# Patient Record
Sex: MALE | Race: WHITE | NOT HISPANIC OR LATINO | Employment: OTHER | URBAN - METROPOLITAN AREA
[De-identification: names, ages, dates, MRNs, and addresses within clinical notes are randomized per-mention and may not be internally consistent; named-entity substitution may affect disease eponyms.]

---

## 2019-01-25 ENCOUNTER — OFFICE VISIT (OUTPATIENT)
Dept: FAMILY MEDICINE CLINIC | Facility: CLINIC | Age: 34
End: 2019-01-25

## 2019-01-25 VITALS
RESPIRATION RATE: 16 BRPM | SYSTOLIC BLOOD PRESSURE: 158 MMHG | HEIGHT: 70 IN | TEMPERATURE: 98.4 F | WEIGHT: 192 LBS | DIASTOLIC BLOOD PRESSURE: 80 MMHG | BODY MASS INDEX: 27.49 KG/M2 | OXYGEN SATURATION: 99 % | HEART RATE: 63 BPM

## 2019-01-25 DIAGNOSIS — T75.3XXA MOTION SICKNESS, INITIAL ENCOUNTER: ICD-10-CM

## 2019-01-25 DIAGNOSIS — R00.2 PALPITATIONS: ICD-10-CM

## 2019-01-25 DIAGNOSIS — Z23 NEED FOR TETANUS BOOSTER: Primary | ICD-10-CM

## 2019-01-25 PROCEDURE — 99203 OFFICE O/P NEW LOW 30 MIN: CPT | Performed by: NURSE PRACTITIONER

## 2019-01-25 PROCEDURE — 90471 IMMUNIZATION ADMIN: CPT | Performed by: NURSE PRACTITIONER

## 2019-01-25 PROCEDURE — 93000 ELECTROCARDIOGRAM COMPLETE: CPT | Performed by: NURSE PRACTITIONER

## 2019-01-25 PROCEDURE — 90715 TDAP VACCINE 7 YRS/> IM: CPT | Performed by: NURSE PRACTITIONER

## 2019-01-25 RX ORDER — OMEPRAZOLE 20 MG/1
20 CAPSULE, DELAYED RELEASE ORAL DAILY PRN
COMMUNITY
End: 2022-04-14

## 2019-01-25 RX ORDER — SCOLOPAMINE TRANSDERMAL SYSTEM 1 MG/1
1 PATCH, EXTENDED RELEASE TRANSDERMAL
Qty: 6 PATCH | Refills: 0 | OUTPATIENT
Start: 2019-01-25 | End: 2019-04-03

## 2019-01-25 NOTE — PROGRESS NOTES
Aneudy Mckeon is a 33 y.o. male who presents  to establish care and for prophylaxis for motion sickness as well as palpitations.    Chief Complaint   Patient presents with   • Establish Care   • Palpitations       Patient states that he has had heart palpitations for about 6 months. Sometimes he feels like it is palpitating and that he has to cough to get it to quick. He is also getting  tomorrow and is going on a cruise on his honeymoon and would like something for motion sickness.           History reviewed. No pertinent past medical history.    History reviewed. No pertinent surgical history.    Family History   Problem Relation Age of Onset   • Diabetes type II Mother    • Diabetes type II Father    • Colon cancer Paternal Grandmother    • Diabetes type II Paternal Grandfather    • Coronary artery disease Paternal Grandfather        Social History     Socioeconomic History   • Marital status: Single     Spouse name: Not on file   • Number of children: Not on file   • Years of education: Not on file   • Highest education level: Not on file   Social Needs   • Financial resource strain: Not on file   • Food insecurity - worry: Not on file   • Food insecurity - inability: Not on file   • Transportation needs - medical: Not on file   • Transportation needs - non-medical: Not on file   Occupational History   • Not on file   Tobacco Use   • Smoking status: Never Smoker   • Smokeless tobacco: Never Used   Substance and Sexual Activity   • Alcohol use: No   • Drug use: No   • Sexual activity: Not on file   Other Topics Concern   • Not on file   Social History Narrative   • Not on file       Allergies   Allergen Reactions   • Benadryl [Diphenhydramine] Rash       ROS    Review of Systems   Constitutional: Positive for fatigue.   Cardiovascular: Positive for palpitations.   Gastrointestinal: Positive for nausea, GERD and indigestion.   Allergic/Immunologic: Positive for environmental allergies.   Neurological:  "Negative for weakness.   Psychiatric/Behavioral: Positive for stress.   All other systems reviewed and are negative.      Vitals:    01/25/19 1314   BP: 158/80   BP Location: Left arm   Patient Position: Sitting   Cuff Size: Adult   Pulse: 63   Resp: 16   Temp: 98.4 °F (36.9 °C)   TempSrc: Oral   SpO2: 99%   Weight: 87.1 kg (192 lb)   Height: 177.8 cm (70\")   PainSc: 0-No pain         Current Outpatient Medications:   •  omeprazole (priLOSEC) 20 MG capsule, Take 20 mg by mouth Daily., Disp: , Rfl:   •  Probiotic Product (PROBIOTIC-10 PO), Take  by mouth., Disp: , Rfl:   •  Scopolamine (TRANSDERM-SCOP, 1.5 MG,) 1.5 MG/3DAYS patch, Place 1 patch on the skin as directed by provider Every 72 (Seventy-Two) Hours., Disp: 6 patch, Rfl: 0    PE    Physical Exam   Nursing note and vitals reviewed.       A/P    Aneudy was seen today for establish care and palpitations.    Diagnoses and all orders for this visit:    Need for tetanus booster  -     Tdap Vaccine Greater Than or Equal To 8yo IM    Palpitations  -     ECG 12 Lead    Motion sickness, initial encounter  -     Scopolamine (TRANSDERM-SCOP, 1.5 MG,) 1.5 MG/3DAYS patch; Place 1 patch on the skin as directed by provider Every 72 (Seventy-Two) Hours.      ECG 12 Lead  Date/Time: 1/25/2019 1:59 PM  Performed by: Bailey Duncan APRN  Authorized by: Bailey Duncan APRN   Comparison: not compared with previous ECG   Previous ECG: no previous ECG available  Rhythm: sinus bradycardia  Rate: bradycardic  BPM: 52  Conduction: conduction normal  ST Segments: ST segments normal  T flattening: III  QRS axis: normal  Clinical impression: non-specific ECG              Plan of care reviewed with patient at the conclusion of today's visit. Education was provided regarding diagnosis, management and any prescribed or recommended OTC medications.  Patient verbalizes understanding of and agreement with management plan.    Return in about 3 months (around 4/25/2019) for Annual. "     Bailey Duncan, APRN

## 2019-11-07 ENCOUNTER — OFFICE VISIT (OUTPATIENT)
Dept: FAMILY MEDICINE CLINIC | Facility: CLINIC | Age: 34
End: 2019-11-07

## 2019-11-07 VITALS
OXYGEN SATURATION: 98 % | BODY MASS INDEX: 24.68 KG/M2 | WEIGHT: 172.4 LBS | SYSTOLIC BLOOD PRESSURE: 110 MMHG | RESPIRATION RATE: 12 BRPM | TEMPERATURE: 97 F | HEIGHT: 70 IN | HEART RATE: 57 BPM | DIASTOLIC BLOOD PRESSURE: 78 MMHG

## 2019-11-07 DIAGNOSIS — H65.93 MIDDLE EAR EFFUSION, BILATERAL: ICD-10-CM

## 2019-11-07 DIAGNOSIS — J01.00 ACUTE NON-RECURRENT MAXILLARY SINUSITIS: Primary | ICD-10-CM

## 2019-11-07 PROCEDURE — 99213 OFFICE O/P EST LOW 20 MIN: CPT | Performed by: NURSE PRACTITIONER

## 2019-11-07 RX ORDER — AMOXICILLIN AND CLAVULANATE POTASSIUM 875; 125 MG/1; MG/1
1 TABLET, FILM COATED ORAL 2 TIMES DAILY
Qty: 20 TABLET | Refills: 0 | Status: SHIPPED | OUTPATIENT
Start: 2019-11-07 | End: 2019-11-07 | Stop reason: SDUPTHER

## 2019-11-07 RX ORDER — PSEUDOEPHEDRINE HYDROCHLORIDE 60 MG/1
60 TABLET, FILM COATED ORAL EVERY 4 HOURS PRN
Qty: 20 TABLET | Refills: 0 | Status: SHIPPED | OUTPATIENT
Start: 2019-11-07 | End: 2019-11-07 | Stop reason: SDUPTHER

## 2019-11-07 RX ORDER — PSEUDOEPHEDRINE HYDROCHLORIDE 60 MG/1
60 TABLET, FILM COATED ORAL EVERY 4 HOURS PRN
Qty: 20 TABLET | Refills: 0 | Status: SHIPPED | OUTPATIENT
Start: 2019-11-07 | End: 2020-02-11

## 2019-11-07 RX ORDER — INFLUENZA VIRUS VACCINE 15; 15; 15; 15 UG/.5ML; UG/.5ML; UG/.5ML; UG/.5ML
SUSPENSION INTRAMUSCULAR
Refills: 0 | COMMUNITY
Start: 2019-10-25 | End: 2020-02-11

## 2019-11-07 RX ORDER — AMOXICILLIN AND CLAVULANATE POTASSIUM 875; 125 MG/1; MG/1
1 TABLET, FILM COATED ORAL 2 TIMES DAILY
Qty: 20 TABLET | Refills: 0 | Status: SHIPPED | OUTPATIENT
Start: 2019-11-07 | End: 2019-11-17

## 2020-02-11 ENCOUNTER — OFFICE VISIT (OUTPATIENT)
Dept: FAMILY MEDICINE CLINIC | Facility: CLINIC | Age: 35
End: 2020-02-11

## 2020-02-11 VITALS
WEIGHT: 184 LBS | HEART RATE: 65 BPM | OXYGEN SATURATION: 98 % | BODY MASS INDEX: 26.34 KG/M2 | SYSTOLIC BLOOD PRESSURE: 130 MMHG | RESPIRATION RATE: 13 BRPM | DIASTOLIC BLOOD PRESSURE: 62 MMHG | TEMPERATURE: 97.9 F | HEIGHT: 70 IN

## 2020-02-11 DIAGNOSIS — Z00.00 ANNUAL PHYSICAL EXAM: ICD-10-CM

## 2020-02-11 DIAGNOSIS — R00.2 PALPITATIONS: Primary | ICD-10-CM

## 2020-02-11 DIAGNOSIS — Z13.220 LIPID SCREENING: ICD-10-CM

## 2020-02-11 DIAGNOSIS — Z12.5 PROSTATE CANCER SCREENING: ICD-10-CM

## 2020-02-11 PROBLEM — K21.9 GERD (GASTROESOPHAGEAL REFLUX DISEASE): Status: ACTIVE | Noted: 2020-02-11

## 2020-02-11 PROBLEM — J30.89 ENVIRONMENTAL AND SEASONAL ALLERGIES: Status: ACTIVE | Noted: 2020-02-11

## 2020-02-11 LAB
ALBUMIN SERPL-MCNC: 4.8 G/DL (ref 3.5–5.2)
ALBUMIN/GLOB SERPL: 2 G/DL
ALP SERPL-CCNC: 56 U/L (ref 39–117)
ALT SERPL W P-5'-P-CCNC: 32 U/L (ref 1–41)
ANION GAP SERPL CALCULATED.3IONS-SCNC: 14 MMOL/L (ref 5–15)
AST SERPL-CCNC: 18 U/L (ref 1–40)
BASOPHILS # BLD AUTO: 0.04 10*3/MM3 (ref 0–0.2)
BASOPHILS NFR BLD AUTO: 0.7 % (ref 0–1.5)
BILIRUB BLD-MCNC: NEGATIVE MG/DL
BILIRUB SERPL-MCNC: 0.5 MG/DL (ref 0.2–1.2)
BUN BLD-MCNC: 17 MG/DL (ref 6–20)
BUN/CREAT SERPL: 17.3 (ref 7–25)
CALCIUM SPEC-SCNC: 10 MG/DL (ref 8.6–10.5)
CHLORIDE SERPL-SCNC: 102 MMOL/L (ref 98–107)
CHOLEST SERPL-MCNC: 155 MG/DL (ref 0–200)
CLARITY, POC: CLEAR
CO2 SERPL-SCNC: 27 MMOL/L (ref 22–29)
COLOR UR: YELLOW
CREAT BLD-MCNC: 0.98 MG/DL (ref 0.76–1.27)
DEPRECATED RDW RBC AUTO: 39.8 FL (ref 37–54)
EOSINOPHIL # BLD AUTO: 0.09 10*3/MM3 (ref 0–0.4)
EOSINOPHIL NFR BLD AUTO: 1.5 % (ref 0.3–6.2)
ERYTHROCYTE [DISTWIDTH] IN BLOOD BY AUTOMATED COUNT: 12.1 % (ref 12.3–15.4)
GFR SERPL CREATININE-BSD FRML MDRD: 89 ML/MIN/1.73
GLOBULIN UR ELPH-MCNC: 2.4 GM/DL
GLUCOSE BLD-MCNC: 82 MG/DL (ref 65–99)
GLUCOSE UR STRIP-MCNC: NEGATIVE MG/DL
HBA1C MFR BLD: 5.1 % (ref 4.8–5.6)
HCT VFR BLD AUTO: 44.9 % (ref 37.5–51)
HDLC SERPL QL: 2.82
HDLC SERPL-MCNC: 55 MG/DL (ref 40–60)
HGB BLD-MCNC: 15.4 G/DL (ref 13–17.7)
IMM GRANULOCYTES # BLD AUTO: 0.04 10*3/MM3 (ref 0–0.05)
IMM GRANULOCYTES NFR BLD AUTO: 0.7 % (ref 0–0.5)
KETONES UR QL: NEGATIVE
LDLC SERPL CALC-MCNC: 71 MG/DL (ref 0–100)
LEUKOCYTE EST, POC: NEGATIVE
LYMPHOCYTES # BLD AUTO: 1.37 10*3/MM3 (ref 0.7–3.1)
LYMPHOCYTES NFR BLD AUTO: 22.6 % (ref 19.6–45.3)
MCH RBC QN AUTO: 31.1 PG (ref 26.6–33)
MCHC RBC AUTO-ENTMCNC: 34.3 G/DL (ref 31.5–35.7)
MCV RBC AUTO: 90.7 FL (ref 79–97)
MONOCYTES # BLD AUTO: 0.57 10*3/MM3 (ref 0.1–0.9)
MONOCYTES NFR BLD AUTO: 9.4 % (ref 5–12)
NEUTROPHILS # BLD AUTO: 3.94 10*3/MM3 (ref 1.7–7)
NEUTROPHILS NFR BLD AUTO: 65.1 % (ref 42.7–76)
NITRITE UR-MCNC: NEGATIVE MG/ML
NRBC BLD AUTO-RTO: 0 /100 WBC (ref 0–0.2)
PH UR: 5.5 [PH] (ref 5–8)
PLATELET # BLD AUTO: 213 10*3/MM3 (ref 140–450)
PMV BLD AUTO: 10.4 FL (ref 6–12)
POTASSIUM BLD-SCNC: 4.5 MMOL/L (ref 3.5–5.2)
PROT SERPL-MCNC: 7.2 G/DL (ref 6–8.5)
PROT UR STRIP-MCNC: NEGATIVE MG/DL
PSA SERPL-MCNC: 0.59 NG/ML (ref 0–4)
RBC # BLD AUTO: 4.95 10*6/MM3 (ref 4.14–5.8)
RBC # UR STRIP: NEGATIVE /UL
SODIUM BLD-SCNC: 143 MMOL/L (ref 136–145)
SP GR UR: 1 (ref 1–1.03)
TRIGL SERPL-MCNC: 145 MG/DL (ref 0–150)
TSH SERPL DL<=0.05 MIU/L-ACNC: 2.24 UIU/ML (ref 0.27–4.2)
UROBILINOGEN UR QL: NORMAL
VIT B12 BLD-MCNC: 706 PG/ML (ref 211–946)
VLDLC SERPL-MCNC: 29 MG/DL (ref 5–40)
WBC NRBC COR # BLD: 6.05 10*3/MM3 (ref 3.4–10.8)

## 2020-02-11 PROCEDURE — 80061 LIPID PANEL: CPT | Performed by: NURSE PRACTITIONER

## 2020-02-11 PROCEDURE — 84443 ASSAY THYROID STIM HORMONE: CPT | Performed by: NURSE PRACTITIONER

## 2020-02-11 PROCEDURE — 99395 PREV VISIT EST AGE 18-39: CPT | Performed by: NURSE PRACTITIONER

## 2020-02-11 PROCEDURE — 83036 HEMOGLOBIN GLYCOSYLATED A1C: CPT | Performed by: NURSE PRACTITIONER

## 2020-02-11 PROCEDURE — 80053 COMPREHEN METABOLIC PANEL: CPT | Performed by: NURSE PRACTITIONER

## 2020-02-11 PROCEDURE — 82607 VITAMIN B-12: CPT | Performed by: NURSE PRACTITIONER

## 2020-02-11 PROCEDURE — G0103 PSA SCREENING: HCPCS | Performed by: NURSE PRACTITIONER

## 2020-02-11 PROCEDURE — 93000 ELECTROCARDIOGRAM COMPLETE: CPT | Performed by: NURSE PRACTITIONER

## 2020-02-11 PROCEDURE — 82652 VIT D 1 25-DIHYDROXY: CPT | Performed by: NURSE PRACTITIONER

## 2020-02-11 PROCEDURE — 81003 URINALYSIS AUTO W/O SCOPE: CPT | Performed by: NURSE PRACTITIONER

## 2020-02-11 PROCEDURE — 85025 COMPLETE CBC W/AUTO DIFF WBC: CPT | Performed by: NURSE PRACTITIONER

## 2020-02-11 NOTE — PROGRESS NOTES
"Aneudy Mckeon is a 34 y.o. male who presents for palpitations.    Chief Complaint   Patient presents with   • Palpitations     Pt state that he was told years ago that he had heart murmer and now he has had off and on heart palpatations. Pt is wanting to cardio work done to rule out the murmer.        Palpitations    This is a recurrent problem. The current episode started more than 1 year ago. The problem occurs every several days. The problem has been gradually worsening. On average, each episode lasts 10 seconds. The symptoms are aggravated by stress. Associated symptoms include anxiety, chest pain and an irregular heartbeat. Associated symptoms comments: \"feels like a muscle spasm\". Treatments tried: cough. The treatment provided no relief. Risk factors include being male.         History reviewed. No pertinent past medical history.    Past Surgical History:   Procedure Laterality Date   • DENTAL PROCEDURE         Family History   Problem Relation Age of Onset   • Diabetes type II Mother    • Diabetes type II Father    • Colon cancer Paternal Grandmother    • Diabetes type II Paternal Grandfather    • Coronary artery disease Paternal Grandfather        Social History     Socioeconomic History   • Marital status: Single     Spouse name: Not on file   • Number of children: Not on file   • Years of education: Not on file   • Highest education level: Not on file   Tobacco Use   • Smoking status: Never Smoker   • Smokeless tobacco: Never Used   Substance and Sexual Activity   • Alcohol use: No   • Drug use: No   • Sexual activity: Defer       Allergies   Allergen Reactions   • Benadryl [Diphenhydramine] Rash       ROS    Review of Systems   Cardiovascular: Positive for chest pain and palpitations. Negative for leg swelling.   Gastrointestinal: Positive for abdominal pain, diarrhea and GERD.   Psychiatric/Behavioral: Positive for stress. The patient is nervous/anxious.        Vitals:    02/11/20 0904   BP: 130/62 " "  Pulse: 65   Resp: 13   Temp: 97.9 °F (36.6 °C)   SpO2: 98%   Weight: 83.5 kg (184 lb)   Height: 177.8 cm (70\")         Current Outpatient Medications:   •  Fexofenadine HCl (ALLEGRA ALLERGY PO), Take  by mouth., Disp: , Rfl:   •  Multiple Vitamin (MULTI-VITAMIN DAILY PO), Take  by mouth., Disp: , Rfl:   •  omeprazole (priLOSEC) 20 MG capsule, Take 20 mg by mouth Daily., Disp: , Rfl:   •  Probiotic Product (PROBIOTIC-10 PO), Take  by mouth., Disp: , Rfl:   •  azithromycin (ZITHROMAX) 250 MG tablet, Take 1 tablet by mouth Daily. Take 2 tablets the first day, then 1 tablet daily for 4 days., Disp: 6 tablet, Rfl: 0    PE    Physical Exam   Constitutional: He is oriented to person, place, and time. He appears well-developed and well-nourished.   HENT:   Head: Normocephalic and atraumatic.   Right Ear: External ear normal.   Left Ear: External ear normal.   Nose: Nose normal.   Mouth/Throat: Oropharynx is clear and moist.   Eyes: Pupils are equal, round, and reactive to light. Conjunctivae and EOM are normal.   Neck: Normal range of motion. Neck supple. No JVD present. No tracheal deviation present. No thyromegaly present.   Cardiovascular: Normal rate, regular rhythm, normal heart sounds and intact distal pulses. Exam reveals no gallop and no friction rub.   No murmur heard.  Pulmonary/Chest: Effort normal and breath sounds normal. No stridor. No respiratory distress. He has no wheezes. He has no rales.   Abdominal: Soft. Bowel sounds are normal. He exhibits no distension and no mass. There is no tenderness. There is no rebound and no guarding. No hernia.   Musculoskeletal: Normal range of motion.   Lymphadenopathy:     He has cervical adenopathy.   Neurological: He is alert and oriented to person, place, and time.   Skin: Skin is warm and dry. Capillary refill takes less than 2 seconds.   Psychiatric: He has a normal mood and affect. His behavior is normal. Judgment and thought content normal.   Nursing note and " vitals reviewed.        ECG 12 Lead  Date/Time: 2/11/2020 9:31 AM  Performed by: Bailey Duncan APRN  Authorized by: Bailey Duncan APRN   Comparison: compared with previous ECG from 1/28/2019  Comparison to previous ECG: Sinus bradycardia  Rhythm: sinus bradycardia  Rate: bradycardic  BPM: 52  Conduction: incomplete right bundle branch block  T elevation: V3  T inversion: V2  QRS axis: normal    Clinical impression: abnormal EKG  Comments: Inverted p waves on V1 and V2          A/P    Problem List Items Addressed This Visit        Cardiovascular and Mediastinum    Palpitations - Primary    Relevant Orders    ECG 12 Lead    Ambulatory Referral to [facility]      Other Visit Diagnoses     Annual physical exam        Relevant Orders    CBC Auto Differential    Comprehensive Metabolic Panel    TSH    Hemoglobin A1c    Lipid Panel With / Chol / HDL Ratio    Vitamin D 1,25 Dihydroxy    Vitamin B12    POC Urinalysis Dipstick, Automated    PSA Screen    Lipid screening        Relevant Orders    Lipid Panel With / Chol / HDL Ratio    Prostate cancer screening        Relevant Orders    PSA Screen      Will contact patient with lab results.    Plan of care reviewed with patient at the conclusion of today's visit. Education was provided regarding diagnosis, management and any prescribed or recommended OTC medications.  Patient verbalizes understanding of and agreement with management plan.    Return in about 1 year (around 2/11/2021) for Annual.     FORTINO Phillips

## 2020-02-12 ENCOUNTER — OFFICE VISIT (OUTPATIENT)
Dept: CARDIOLOGY | Facility: HOSPITAL | Age: 35
End: 2020-02-12

## 2020-02-12 ENCOUNTER — HOSPITAL ENCOUNTER (OUTPATIENT)
Dept: CARDIOLOGY | Facility: HOSPITAL | Age: 35
Discharge: HOME | End: 2020-02-12
Admitting: NURSE PRACTITIONER

## 2020-02-12 VITALS
OXYGEN SATURATION: 99 % | HEIGHT: 70 IN | RESPIRATION RATE: 18 BRPM | SYSTOLIC BLOOD PRESSURE: 126 MMHG | DIASTOLIC BLOOD PRESSURE: 70 MMHG | WEIGHT: 183.38 LBS | BODY MASS INDEX: 26.25 KG/M2 | HEART RATE: 63 BPM | TEMPERATURE: 97.6 F

## 2020-02-12 DIAGNOSIS — R00.2 PALPITATIONS: ICD-10-CM

## 2020-02-12 DIAGNOSIS — R07.89 CHEST PAIN, ATYPICAL: ICD-10-CM

## 2020-02-12 DIAGNOSIS — R42 DIZZINESS: ICD-10-CM

## 2020-02-12 LAB — 1,25(OH)2D3 SERPL-MCNC: 74.7 PG/ML (ref 19.9–79.3)

## 2020-02-12 PROCEDURE — 0296T HC EXT ECG > 48HR TO 21 DAY RCRD W/CONECT INTL RCRD: CPT

## 2020-02-12 PROCEDURE — 99214 OFFICE O/P EST MOD 30 MIN: CPT | Performed by: NURSE PRACTITIONER

## 2020-02-12 PROCEDURE — 0298T HOLTER MONITOR - 72 HOUR UP TO 21 DAY: CPT | Performed by: INTERNAL MEDICINE

## 2020-02-12 NOTE — PROGRESS NOTES
[facility]  [facility]      Encounter Date:02/12/2020     Aneudy Mckeon  [address] [place] [address]  [unfilled]    1985    Bailey Duncan APRN    Aneudy Mckeon is a 34 y.o. male.      Subjective:     Chief Complaint:  Palpitations and Establish Care       HPI     34-year-old male presented to his primary care provider on 2/11/2020 with complaints of palpitation.  This is a recurrent problem.  Palpitations have been intermittent for greater than 1 year.  Patient reports that palpitations have gradually worsened.  Intermittent, waxing and waning.  Palpitations can last for approximately 10 seconds, but occur multiple times during the day.  Aggravated by stress.  Associated anxiety, chest pain and pressure.   Patient complains that symptoms feel like a muscle spasm in his chest.  Patient has tried coughing to alleviate symptoms with no relief.  Patient has a history of GERD where he takes Prilosec. Not associated with caffeine.  Pt report intermittent dizziness and dyspnea (mild) with activity.         No hx of premature CAD.         Patient Active Problem List    Diagnosis Date Noted   • Palpitations 02/11/2020   • Environmental and seasonal allergies 02/11/2020   • GERD (gastroesophageal reflux disease) 02/11/2020         Past Surgical History:   Procedure Laterality Date   • DENTAL PROCEDURE         Allergies   Allergen Reactions   • Benadryl [Diphenhydramine] Rash         Current Outpatient Medications:   •  Multiple Vitamin (MULTI-VITAMIN DAILY PO), Take  by mouth., Disp: , Rfl:   •  omeprazole (priLOSEC) 20 MG capsule, Take 20 mg by mouth Daily., Disp: , Rfl:   •  Probiotic Product (PROBIOTIC-10 PO), Take  by mouth., Disp: , Rfl:   •  Fexofenadine HCl (ALLEGRA ALLERGY PO), Take  by mouth., Disp: , Rfl:     The following portions of the patient's history were reviewed and updated today during office visit as appropriate: allergies, current medications, past family  "history, past medical history, past social history, past surgical history and problem list.    Review of Systems   Cardiovascular: Positive for chest pain and palpitations.   Neurological: Positive for dizziness.   Psychiatric/Behavioral: The patient is nervous/anxious.    All other systems reviewed and are negative.      Objective:     Vitals:    02/12/20 1514 02/12/20 1517 02/12/20 1518   BP: 136/75 123/62 126/70   BP Location: Right arm Right arm Left arm   Patient Position: Standing Sitting Sitting   Cuff Size: Adult Adult Adult   Pulse: 76  63   Resp:   18   Temp:   97.6 °F (36.4 °C)   TempSrc:   Temporal   SpO2: 96%  99%   Weight:   83.2 kg (183 lb 6 oz)   Height:   177.8 cm (70\")         Physical Exam   Constitutional: He is oriented to person, place, and time. He appears well-developed and well-nourished. No distress.   HENT:   Mouth/Throat: Oropharynx is clear and moist.   Eyes: No scleral icterus.   Neck: No hepatojugular reflux and no JVD present. Carotid bruit is not present. No tracheal deviation present. No thyromegaly present.   Cardiovascular: Normal rate, regular rhythm, normal heart sounds and intact distal pulses. Exam reveals no friction rub.   No murmur heard.  Pulmonary/Chest: Effort normal and breath sounds normal.   Abdominal: Soft. Bowel sounds are normal. He exhibits no distension. There is no tenderness.   Musculoskeletal: He exhibits no edema.   Lymphadenopathy:     He has no cervical adenopathy.   Neurological: He is alert and oriented to person, place, and time.   Skin: Skin is warm, dry and intact. No rash noted. No cyanosis or erythema. No pallor.   Psychiatric: He has a normal mood and affect. His behavior is normal. Thought content normal.   Vitals reviewed.      Lab and Diagnostic Review:  Office Visit on 02/11/2020   Component Date Value Ref Range Status   • WBC 02/11/2020 6.05  3.40 - 10.80 10*3/mm3 Final   • RBC 02/11/2020 4.95  4.14 - 5.80 10*6/mm3 Final   • Hemoglobin " 02/11/2020 15.4  13.0 - 17.7 g/dL Final   • Hematocrit 02/11/2020 44.9  37.5 - 51.0 % Final   • MCV 02/11/2020 90.7  79.0 - 97.0 fL Final   • MCH 02/11/2020 31.1  26.6 - 33.0 pg Final   • MCHC 02/11/2020 34.3  31.5 - 35.7 g/dL Final   • RDW 02/11/2020 12.1* 12.3 - 15.4 % Final   • RDW-SD 02/11/2020 39.8  37.0 - 54.0 fl Final   • MPV 02/11/2020 10.4  6.0 - 12.0 fL Final   • Platelets 02/11/2020 213  140 - 450 10*3/mm3 Final   • Neutrophil % 02/11/2020 65.1  42.7 - 76.0 % Final   • Lymphocyte % 02/11/2020 22.6  19.6 - 45.3 % Final   • Monocyte % 02/11/2020 9.4  5.0 - 12.0 % Final   • Eosinophil % 02/11/2020 1.5  0.3 - 6.2 % Final   • Basophil % 02/11/2020 0.7  0.0 - 1.5 % Final   • Immature Grans % 02/11/2020 0.7* 0.0 - 0.5 % Final   • Neutrophils, Absolute 02/11/2020 3.94  1.70 - 7.00 10*3/mm3 Final   • Lymphocytes, Absolute 02/11/2020 1.37  0.70 - 3.10 10*3/mm3 Final   • Monocytes, Absolute 02/11/2020 0.57  0.10 - 0.90 10*3/mm3 Final   • Eosinophils, Absolute 02/11/2020 0.09  0.00 - 0.40 10*3/mm3 Final   • Basophils, Absolute 02/11/2020 0.04  0.00 - 0.20 10*3/mm3 Final   • Immature Grans, Absolute 02/11/2020 0.04  0.00 - 0.05 10*3/mm3 Final   • nRBC 02/11/2020 0.0  0.0 - 0.2 /100 WBC Final   • Glucose 02/11/2020 82  65 - 99 mg/dL Final   • BUN 02/11/2020 17  6 - 20 mg/dL Final   • Creatinine 02/11/2020 0.98  0.76 - 1.27 mg/dL Final   • Sodium 02/11/2020 143  136 - 145 mmol/L Final   • Potassium 02/11/2020 4.5  3.5 - 5.2 mmol/L Final   • Chloride 02/11/2020 102  98 - 107 mmol/L Final   • CO2 02/11/2020 27.0  22.0 - 29.0 mmol/L Final   • Calcium 02/11/2020 10.0  8.6 - 10.5 mg/dL Final   • Total Protein 02/11/2020 7.2  6.0 - 8.5 g/dL Final   • Albumin 02/11/2020 4.80  3.50 - 5.20 g/dL Final   • ALT (SGPT) 02/11/2020 32  1 - 41 U/L Final   • AST (SGOT) 02/11/2020 18  1 - 40 U/L Final   • Alkaline Phosphatase 02/11/2020 56  39 - 117 U/L Final   • Total Bilirubin 02/11/2020 0.5  0.2 - 1.2 mg/dL Final   • eGFR Non African  Amer 02/11/2020 89  >60 mL/min/1.73 Final   • Globulin 02/11/2020 2.4  gm/dL Final   • A/G Ratio 02/11/2020 2.0  g/dL Final   • BUN/Creatinine Ratio 02/11/2020 17.3  7.0 - 25.0 Final   • Anion Gap 02/11/2020 14.0  5.0 - 15.0 mmol/L Final   • TSH 02/11/2020 2.240  0.270 - 4.200 uIU/mL Final   • Hemoglobin A1C 02/11/2020 5.10  4.80 - 5.60 % Final   • Total Cholesterol 02/11/2020 155  0 - 200 mg/dL Final   • Triglycerides 02/11/2020 145  0 - 150 mg/dL Final   • HDL Cholesterol 02/11/2020 55  40 - 60 mg/dL Final   • LDL Cholesterol  02/11/2020 71  0 - 100 mg/dL Final   • VLDL Cholesterol 02/11/2020 29  5 - 40 mg/dL Final   • Chol/HDL Ratio 02/11/2020 2.82   Final   • 1,25-Dihydroxy, Vitamin D 02/11/2020 74.7  19.9 - 79.3 pg/mL Final   • Vitamin B-12 02/11/2020 706  211 - 946 pg/mL Final   • Color 02/11/2020 Yellow  Yellow, Straw, Dark Yellow, Lin Final   • Clarity, UA 02/11/2020 Clear  Clear Final   • Specific Gravity  02/11/2020 1.005  1.005 - 1.030 Final    <=1.005   • pH, Urine 02/11/2020 5.5  5.0 - 8.0 Final   • Leukocytes 02/11/2020 Negative  Negative Final   • Nitrite, UA 02/11/2020 Negative  Negative Final   • Protein, POC 02/11/2020 Negative  Negative mg/dL Final   • Glucose, UA 02/11/2020 Negative  Negative, 1000 mg/dL (3+) mg/dL Final   • Ketones, UA 02/11/2020 Negative  Negative Final   • Urobilinogen, UA 02/11/2020 Normal  Normal Final   • Bilirubin 02/11/2020 Negative  Negative Final   • Blood, UA 02/11/2020 Negative  Negative Final   • PSA 02/11/2020 0.592  0.000 - 4.000 ng/mL Final       Assessment and Plan:         1. Chest pain, atypical    - Treadmill Stress Test; Future    2. Palpitations    - Holter Monitor - 72 Hour Up To 21 Days; Future  - Adult Transthoracic Echo Complete W/ Cont if Necessary Per Protocol; Future    3. Dizziness    - Holter Monitor - 72 Hour Up To 21 Days; Future  - Adult Transthoracic Echo Complete W/ Cont if Necessary Per Protocol; Future    F/u [facility] 6 weeks or sooner.      *Please note that portions of this note were completed with a voice recognition program. Efforts were made to edit the dictations, but occasionally words are mistranscribed.

## 2020-02-21 ENCOUNTER — HOSPITAL ENCOUNTER (OUTPATIENT)
Dept: CARDIOLOGY | Facility: HOSPITAL | Age: 35
Discharge: HOME | End: 2020-02-21
Admitting: NURSE PRACTITIONER

## 2020-02-21 ENCOUNTER — HOSPITAL ENCOUNTER (OUTPATIENT)
Dept: CARDIOLOGY | Facility: HOSPITAL | Age: 35
Discharge: HOME | End: 2020-02-21

## 2020-02-21 VITALS
SYSTOLIC BLOOD PRESSURE: 130 MMHG | WEIGHT: 183.42 LBS | BODY MASS INDEX: 26.26 KG/M2 | HEART RATE: 75 BPM | HEIGHT: 70 IN | DIASTOLIC BLOOD PRESSURE: 80 MMHG

## 2020-02-21 DIAGNOSIS — R00.2 PALPITATIONS: ICD-10-CM

## 2020-02-21 DIAGNOSIS — R07.89 CHEST PAIN, ATYPICAL: ICD-10-CM

## 2020-02-21 DIAGNOSIS — R42 DIZZINESS: ICD-10-CM

## 2020-02-21 LAB
BH CV ECHO MEAS - AO MAX PG (FULL): 1.7 MMHG
BH CV ECHO MEAS - AO MAX PG: 7.3 MMHG
BH CV ECHO MEAS - AO MEAN PG (FULL): 1.2 MMHG
BH CV ECHO MEAS - AO MEAN PG: 4 MMHG
BH CV ECHO MEAS - AO ROOT AREA (BSA CORRECTED): 1.1
BH CV ECHO MEAS - AO ROOT AREA: 3.8 CM^2
BH CV ECHO MEAS - AO ROOT DIAM: 2.2 CM
BH CV ECHO MEAS - AO V2 MAX: 134.7 CM/SEC
BH CV ECHO MEAS - AO V2 MEAN: 88.3 CM/SEC
BH CV ECHO MEAS - AO V2 VTI: 23.9 CM
BH CV ECHO MEAS - ASC AORTA: 2.4 CM
BH CV ECHO MEAS - AVA(I,A): 3.2 CM^2
BH CV ECHO MEAS - AVA(I,D): 3.2 CM^2
BH CV ECHO MEAS - AVA(V,A): 2.7 CM^2
BH CV ECHO MEAS - AVA(V,D): 2.7 CM^2
BH CV ECHO MEAS - BSA(HAYCOCK): 2 M^2
BH CV ECHO MEAS - BSA: 2 M^2
BH CV ECHO MEAS - BZI_BMI: 25.4 KILOGRAMS/M^2
BH CV ECHO MEAS - BZI_METRIC_HEIGHT: 177.8 CM
BH CV ECHO MEAS - BZI_METRIC_WEIGHT: 80.3 KG
BH CV ECHO MEAS - EDV(CUBED): 69.5 ML
BH CV ECHO MEAS - EDV(TEICH): 74.8 ML
BH CV ECHO MEAS - EF(CUBED): 81.4 %
BH CV ECHO MEAS - EF(TEICH): 74.5 %
BH CV ECHO MEAS - ESV(CUBED): 12.9 ML
BH CV ECHO MEAS - ESV(TEICH): 19 ML
BH CV ECHO MEAS - FS: 43 %
BH CV ECHO MEAS - IVS/LVPW: 0.93
BH CV ECHO MEAS - IVSD: 0.92 CM
BH CV ECHO MEAS - LA DIMENSION: 3.3 CM
BH CV ECHO MEAS - LA/AO: 1.5
BH CV ECHO MEAS - LAD MAJOR: 3.9 CM
BH CV ECHO MEAS - LAT PEAK E' VEL: 8.6 CM/SEC
BH CV ECHO MEAS - LATERAL E/E' RATIO: 8.1
BH CV ECHO MEAS - LV MASS(C)D: 124.5 GRAMS
BH CV ECHO MEAS - LV MASS(C)DI: 62.8 GRAMS/M^2
BH CV ECHO MEAS - LV MAX PG: 5.6 MMHG
BH CV ECHO MEAS - LV MEAN PG: 2.8 MMHG
BH CV ECHO MEAS - LV V1 MAX: 118 CM/SEC
BH CV ECHO MEAS - LV V1 MEAN: 76.1 CM/SEC
BH CV ECHO MEAS - LV V1 VTI: 24.9 CM
BH CV ECHO MEAS - LVIDD: 4.1 CM
BH CV ECHO MEAS - LVIDS: 2.3 CM
BH CV ECHO MEAS - LVOT AREA (M): 3.1 CM^2
BH CV ECHO MEAS - LVOT AREA: 3.1 CM^2
BH CV ECHO MEAS - LVOT DIAM: 2 CM
BH CV ECHO MEAS - LVPWD: 0.99 CM
BH CV ECHO MEAS - MED PEAK E' VEL: 7.4 CM/SEC
BH CV ECHO MEAS - MEDIAL E/E' RATIO: 9.4
BH CV ECHO MEAS - MV A MAX VEL: 83.9 CM/SEC
BH CV ECHO MEAS - MV DEC TIME: 0.08 SEC
BH CV ECHO MEAS - MV E MAX VEL: 70.6 CM/SEC
BH CV ECHO MEAS - MV E/A: 0.84
BH CV ECHO MEAS - PA ACC SLOPE: 600.7 CM/SEC^2
BH CV ECHO MEAS - PA ACC TIME: 0.18 SEC
BH CV ECHO MEAS - PA MAX PG: 6.2 MMHG
BH CV ECHO MEAS - PA PR(ACCEL): -1 MMHG
BH CV ECHO MEAS - PA V2 MAX: 124.3 CM/SEC
BH CV ECHO MEAS - RVDD: 2.3 CM
BH CV ECHO MEAS - SI(AO): 46.2 ML/M^2
BH CV ECHO MEAS - SI(CUBED): 28.6 ML/M^2
BH CV ECHO MEAS - SI(LVOT): 38.5 ML/M^2
BH CV ECHO MEAS - SI(TEICH): 28.1 ML/M^2
BH CV ECHO MEAS - SV(AO): 91.6 ML
BH CV ECHO MEAS - SV(CUBED): 56.6 ML
BH CV ECHO MEAS - SV(LVOT): 76.4 ML
BH CV ECHO MEAS - SV(TEICH): 55.7 ML
BH CV ECHO MEAS - TAPSE (>1.6): 2.1 CM2
BH CV ECHO MEAS - TV MAX PG: 2.4 MMHG
BH CV ECHO MEAS - TV V2 MAX: 77.5 CM/SEC
BH CV ECHO MEASUREMENTS AVERAGE E/E' RATIO: 8.83
BH CV STRESS BP STAGE 1: NORMAL
BH CV STRESS BP STAGE 2: NORMAL
BH CV STRESS BP STAGE 3: NORMAL
BH CV STRESS DURATION MIN STAGE 1: 3
BH CV STRESS DURATION MIN STAGE 2: 3
BH CV STRESS DURATION MIN STAGE 3: 3
BH CV STRESS DURATION MIN STAGE 4: 3
BH CV STRESS DURATION MIN STAGE 5: 0
BH CV STRESS DURATION SEC STAGE 1: 0
BH CV STRESS DURATION SEC STAGE 2: 0
BH CV STRESS DURATION SEC STAGE 3: 0
BH CV STRESS DURATION SEC STAGE 4: 0
BH CV STRESS DURATION SEC STAGE 5: 22
BH CV STRESS GRADE STAGE 1: 10
BH CV STRESS GRADE STAGE 2: 12
BH CV STRESS GRADE STAGE 3: 14
BH CV STRESS GRADE STAGE 4: 16
BH CV STRESS GRADE STAGE 5: 18
BH CV STRESS HR STAGE 1: 101
BH CV STRESS HR STAGE 2: 117
BH CV STRESS HR STAGE 3: 144
BH CV STRESS HR STAGE 4: 176
BH CV STRESS HR STAGE 5: 176
BH CV STRESS METS STAGE 1: 5
BH CV STRESS METS STAGE 2: 7.5
BH CV STRESS METS STAGE 3: 10
BH CV STRESS METS STAGE 4: 13.5
BH CV STRESS METS STAGE 5: 15
BH CV STRESS PROTOCOL 1: NORMAL
BH CV STRESS RECOVERY BP: NORMAL MMHG
BH CV STRESS RECOVERY HR: 112 BPM
BH CV STRESS SPEED STAGE 1: 1.7
BH CV STRESS SPEED STAGE 2: 2.5
BH CV STRESS SPEED STAGE 3: 3.4
BH CV STRESS SPEED STAGE 4: 4.2
BH CV STRESS SPEED STAGE 5: 5
BH CV STRESS STAGE 1: 1
BH CV STRESS STAGE 2: 2
BH CV STRESS STAGE 3: 3
BH CV STRESS STAGE 4: 4
BH CV STRESS STAGE 5: 5
BH CV VAS BP RIGHT ARM: NORMAL MMHG
BH CV XLRA - RV BASE: 2.9 CM
BH CV XLRA - RV LENGTH: 4.6 CM
BH CV XLRA - RV MID: 2.4 CM
LEFT ATRIUM VOLUME INDEX: 11.6 ML/M^2
LEFT ATRIUM VOLUME: 23 ML
MAXIMAL PREDICTED HEART RATE: 188 BPM
MAXIMAL PREDICTED HEART RATE: 188 BPM
PERCENT MAX PREDICTED HR: 95.21 %
STRESS BASELINE BP: NORMAL MMHG
STRESS BASELINE HR: 75 BPM
STRESS PERCENT HR: 112 %
STRESS POST ESTIMATED WORKLOAD: 14.7 METS
STRESS POST EXERCISE DUR MIN: 12 MIN
STRESS POST EXERCISE DUR SEC: 22 SEC
STRESS POST PEAK BP: NORMAL MMHG
STRESS POST PEAK HR: 179 BPM
STRESS TARGET HR: 160 BPM
STRESS TARGET HR: 160 BPM

## 2020-02-21 PROCEDURE — 93306 TTE W/DOPPLER COMPLETE: CPT | Performed by: INTERNAL MEDICINE

## 2020-02-21 PROCEDURE — 93017 CV STRESS TEST TRACING ONLY: CPT

## 2020-02-21 PROCEDURE — 93018 CV STRESS TEST I&R ONLY: CPT | Performed by: INTERNAL MEDICINE

## 2020-02-21 PROCEDURE — 93306 TTE W/DOPPLER COMPLETE: CPT

## 2020-02-28 ENCOUNTER — TELEPHONE (OUTPATIENT)
Dept: CARDIOLOGY | Facility: HOSPITAL | Age: 35
End: 2020-02-28

## 2020-03-03 NOTE — TELEPHONE ENCOUNTER
His treadmill stress test is acceptable.  No concern for heart blockage.  Echocardiogram was acceptable.  Normal heart muscle strength.  No significant structural or valvular disease.

## 2020-03-18 ENCOUNTER — DOCUMENTATION (OUTPATIENT)
Dept: CARDIOLOGY | Facility: HOSPITAL | Age: 35
End: 2020-03-18

## 2020-03-18 NOTE — PROGRESS NOTES
Called to review heart monitor results.  No concerning arrhythmias.  Patient is no longer having palpitations.  He denies chest pain, pressure, dyspnea, dizziness, syncope.  He had a normal stress test and a normal echocardiogram.  Patient currently asymptomatic.    Patient will follow with primary care provider on a routine basis.  Call the heart valve center as needed.      Reviewed precautions COV-19 preventions.  Patient encouraged to avoid large crowds, avoid unnecessary contact or appointments.  Good hand hygiene.  Avoid sick contacts.  He is currently afebrile with no respiratory symptoms.  If he develops mild respiratory symptoms encouraged to stay at home and self quarantine.  If fever develops or symptoms worsen then encouraged to call the primary care provider for screening and further instructions.

## 2021-10-06 ENCOUNTER — APPOINTMENT (OUTPATIENT)
Dept: CT IMAGING | Facility: HOSPITAL | Age: 36
End: 2021-10-06

## 2021-10-06 ENCOUNTER — APPOINTMENT (OUTPATIENT)
Dept: ULTRASOUND IMAGING | Facility: HOSPITAL | Age: 36
End: 2021-10-06

## 2021-10-06 ENCOUNTER — HOSPITAL ENCOUNTER (INPATIENT)
Facility: HOSPITAL | Age: 36
LOS: 2 days | Discharge: HOME | End: 2021-10-08
Attending: EMERGENCY MEDICINE | Admitting: INTERNAL MEDICINE

## 2021-10-06 DIAGNOSIS — R10.10 UPPER ABDOMINAL PAIN: ICD-10-CM

## 2021-10-06 DIAGNOSIS — R10.9 ABDOMINAL PAIN: ICD-10-CM

## 2021-10-06 DIAGNOSIS — K80.70 CALCULUS OF GALLBLADDER AND BILE DUCT WITHOUT CHOLECYSTITIS OR OBSTRUCTION: ICD-10-CM

## 2021-10-06 DIAGNOSIS — K85.90 ACUTE PANCREATITIS, UNSPECIFIED COMPLICATION STATUS, UNSPECIFIED PANCREATITIS TYPE: Primary | ICD-10-CM

## 2021-10-06 PROBLEM — I51.89 GRADE I DIASTOLIC DYSFUNCTION: Status: ACTIVE | Noted: 2021-10-06

## 2021-10-06 PROBLEM — R50.9 FEVER: Status: ACTIVE | Noted: 2021-10-06

## 2021-10-06 PROBLEM — A41.9 SEPSIS, UNSPECIFIED ORGANISM (HCC): Status: ACTIVE | Noted: 2021-10-06

## 2021-10-06 PROBLEM — K85.10 ACUTE GALLSTONE PANCREATITIS: Status: ACTIVE | Noted: 2021-10-06

## 2021-10-06 LAB
ALBUMIN SERPL-MCNC: 4.8 G/DL (ref 3.5–5.2)
ALBUMIN/GLOB SERPL: 1.7 G/DL
ALP SERPL-CCNC: 96 U/L (ref 39–117)
ALT SERPL W P-5'-P-CCNC: 203 U/L (ref 1–41)
ANION GAP SERPL CALCULATED.3IONS-SCNC: 12 MMOL/L (ref 5–15)
AST SERPL-CCNC: 190 U/L (ref 1–40)
BASOPHILS # BLD AUTO: 0.04 10*3/MM3 (ref 0–0.2)
BASOPHILS NFR BLD AUTO: 0.3 % (ref 0–1.5)
BILIRUB SERPL-MCNC: 1.6 MG/DL (ref 0–1.2)
BILIRUB UR QL STRIP: NEGATIVE
BUN SERPL-MCNC: 15 MG/DL (ref 6–20)
BUN/CREAT SERPL: 14.9 (ref 7–25)
CALCIUM SPEC-SCNC: 10 MG/DL (ref 8.6–10.5)
CHLORIDE SERPL-SCNC: 104 MMOL/L (ref 98–107)
CLARITY UR: CLEAR
CO2 SERPL-SCNC: 25 MMOL/L (ref 22–29)
COLOR UR: YELLOW
CREAT SERPL-MCNC: 1.01 MG/DL (ref 0.76–1.27)
DEPRECATED RDW RBC AUTO: 40.2 FL (ref 37–54)
EOSINOPHIL # BLD AUTO: 0.02 10*3/MM3 (ref 0–0.4)
EOSINOPHIL NFR BLD AUTO: 0.1 % (ref 0.3–6.2)
ERYTHROCYTE [DISTWIDTH] IN BLOOD BY AUTOMATED COUNT: 11.9 % (ref 12.3–15.4)
GFR SERPL CREATININE-BSD FRML MDRD: 85 ML/MIN/1.73
GLOBULIN UR ELPH-MCNC: 2.9 GM/DL
GLUCOSE SERPL-MCNC: 165 MG/DL (ref 65–99)
GLUCOSE UR STRIP-MCNC: NEGATIVE MG/DL
HCT VFR BLD AUTO: 47 % (ref 37.5–51)
HGB BLD-MCNC: 15.8 G/DL (ref 13–17.7)
HGB UR QL STRIP.AUTO: NEGATIVE
HOLD SPECIMEN: NORMAL
IMM GRANULOCYTES # BLD AUTO: 0.1 10*3/MM3 (ref 0–0.05)
IMM GRANULOCYTES NFR BLD AUTO: 0.7 % (ref 0–0.5)
KETONES UR QL STRIP: NEGATIVE
LEUKOCYTE ESTERASE UR QL STRIP.AUTO: NEGATIVE
LIPASE SERPL-CCNC: 2821 U/L (ref 13–60)
LYMPHOCYTES # BLD AUTO: 0.61 10*3/MM3 (ref 0.7–3.1)
LYMPHOCYTES NFR BLD AUTO: 4.3 % (ref 19.6–45.3)
MCH RBC QN AUTO: 31.2 PG (ref 26.6–33)
MCHC RBC AUTO-ENTMCNC: 33.6 G/DL (ref 31.5–35.7)
MCV RBC AUTO: 92.9 FL (ref 79–97)
MONOCYTES # BLD AUTO: 0.94 10*3/MM3 (ref 0.1–0.9)
MONOCYTES NFR BLD AUTO: 6.6 % (ref 5–12)
NEUTROPHILS NFR BLD AUTO: 12.46 10*3/MM3 (ref 1.7–7)
NEUTROPHILS NFR BLD AUTO: 88 % (ref 42.7–76)
NITRITE UR QL STRIP: NEGATIVE
NRBC BLD AUTO-RTO: 0 /100 WBC (ref 0–0.2)
PH UR STRIP.AUTO: 8 [PH] (ref 5–8)
PLATELET # BLD AUTO: 193 10*3/MM3 (ref 140–450)
PMV BLD AUTO: 10.3 FL (ref 6–12)
POTASSIUM SERPL-SCNC: 4.2 MMOL/L (ref 3.5–5.2)
PROT SERPL-MCNC: 7.7 G/DL (ref 6–8.5)
PROT UR QL STRIP: NEGATIVE
RBC # BLD AUTO: 5.06 10*6/MM3 (ref 4.14–5.8)
SODIUM SERPL-SCNC: 141 MMOL/L (ref 136–145)
SP GR UR STRIP: 1.01 (ref 1–1.03)
UROBILINOGEN UR QL STRIP: NORMAL
WBC # BLD AUTO: 14.17 10*3/MM3 (ref 3.4–10.8)
WHOLE BLOOD HOLD SPECIMEN: NORMAL

## 2021-10-06 PROCEDURE — 93010 ELECTROCARDIOGRAM REPORT: CPT | Performed by: INTERNAL MEDICINE

## 2021-10-06 PROCEDURE — 81003 URINALYSIS AUTO W/O SCOPE: CPT | Performed by: EMERGENCY MEDICINE

## 2021-10-06 PROCEDURE — 25010000002 IOPAMIDOL 61 % SOLUTION: Performed by: EMERGENCY MEDICINE

## 2021-10-06 PROCEDURE — 83690 ASSAY OF LIPASE: CPT | Performed by: EMERGENCY MEDICINE

## 2021-10-06 PROCEDURE — 80053 COMPREHEN METABOLIC PANEL: CPT | Performed by: EMERGENCY MEDICINE

## 2021-10-06 PROCEDURE — 74177 CT ABD & PELVIS W/CONTRAST: CPT

## 2021-10-06 PROCEDURE — 85025 COMPLETE CBC W/AUTO DIFF WBC: CPT | Performed by: EMERGENCY MEDICINE

## 2021-10-06 PROCEDURE — G0378 HOSPITAL OBSERVATION PER HR: HCPCS

## 2021-10-06 PROCEDURE — 99284 EMERGENCY DEPT VISIT MOD MDM: CPT

## 2021-10-06 PROCEDURE — 76705 ECHO EXAM OF ABDOMEN: CPT

## 2021-10-06 PROCEDURE — 93005 ELECTROCARDIOGRAM TRACING: CPT | Performed by: FAMILY MEDICINE

## 2021-10-06 PROCEDURE — 99223 1ST HOSP IP/OBS HIGH 75: CPT | Performed by: FAMILY MEDICINE

## 2021-10-06 PROCEDURE — 87641 MR-STAPH DNA AMP PROBE: CPT | Performed by: FAMILY MEDICINE

## 2021-10-06 PROCEDURE — 80306 DRUG TEST PRSMV INSTRMNT: CPT | Performed by: FAMILY MEDICINE

## 2021-10-06 RX ORDER — HYDROMORPHONE HYDROCHLORIDE 1 MG/ML
0.25 INJECTION, SOLUTION INTRAMUSCULAR; INTRAVENOUS; SUBCUTANEOUS EVERY 4 HOURS PRN
Status: DISCONTINUED | OUTPATIENT
Start: 2021-10-06 | End: 2021-10-07 | Stop reason: SDUPTHER

## 2021-10-06 RX ORDER — SODIUM CHLORIDE 9 MG/ML
125 INJECTION, SOLUTION INTRAVENOUS CONTINUOUS
Status: DISCONTINUED | OUTPATIENT
Start: 2021-10-06 | End: 2021-10-08 | Stop reason: HOSPADM

## 2021-10-06 RX ORDER — ACETAMINOPHEN 325 MG/1
650 TABLET ORAL EVERY 6 HOURS PRN
Status: DISCONTINUED | OUTPATIENT
Start: 2021-10-06 | End: 2021-10-08 | Stop reason: HOSPADM

## 2021-10-06 RX ORDER — SODIUM CHLORIDE 0.9 % (FLUSH) 0.9 %
10 SYRINGE (ML) INJECTION AS NEEDED
Status: DISCONTINUED | OUTPATIENT
Start: 2021-10-06 | End: 2021-10-08 | Stop reason: HOSPADM

## 2021-10-06 RX ORDER — SODIUM CHLORIDE 9 MG/ML
10 INJECTION INTRAVENOUS AS NEEDED
Status: DISCONTINUED | OUTPATIENT
Start: 2021-10-06 | End: 2021-10-08 | Stop reason: HOSPADM

## 2021-10-06 RX ORDER — NALOXONE HCL 0.4 MG/ML
0.4 VIAL (ML) INJECTION
Status: DISCONTINUED | OUTPATIENT
Start: 2021-10-06 | End: 2021-10-07 | Stop reason: SDUPTHER

## 2021-10-06 RX ORDER — SODIUM CHLORIDE 0.9 % (FLUSH) 0.9 %
10 SYRINGE (ML) INJECTION EVERY 12 HOURS SCHEDULED
Status: DISCONTINUED | OUTPATIENT
Start: 2021-10-07 | End: 2021-10-08 | Stop reason: HOSPADM

## 2021-10-06 RX ORDER — PANTOPRAZOLE SODIUM 40 MG/10ML
40 INJECTION, POWDER, LYOPHILIZED, FOR SOLUTION INTRAVENOUS DAILY
Status: DISCONTINUED | OUTPATIENT
Start: 2021-10-07 | End: 2021-10-08

## 2021-10-06 RX ORDER — ONDANSETRON 2 MG/ML
4 INJECTION INTRAMUSCULAR; INTRAVENOUS
Status: DISCONTINUED | OUTPATIENT
Start: 2021-10-06 | End: 2021-10-07 | Stop reason: SDUPTHER

## 2021-10-06 RX ADMIN — IOPAMIDOL 100 ML: 612 INJECTION, SOLUTION INTRAVENOUS at 18:02

## 2021-10-06 RX ADMIN — SODIUM CHLORIDE 125 ML/HR: 9 INJECTION, SOLUTION INTRAVENOUS at 22:22

## 2021-10-06 RX ADMIN — ACETAMINOPHEN 650 MG: 325 TABLET, FILM COATED ORAL at 23:50

## 2021-10-06 RX ADMIN — PANTOPRAZOLE SODIUM 40 MG: 40 INJECTION, POWDER, FOR SOLUTION INTRAVENOUS at 23:51

## 2021-10-06 RX ADMIN — SODIUM CHLORIDE 1000 ML: 9 INJECTION, SOLUTION INTRAVENOUS at 18:22

## 2021-10-06 NOTE — ED PROVIDER NOTES
[facility]    EMERGENCY DEPARTMENT ENCOUNTER      Pt Name: Aneudy Mckeon  MRN: [id number]  YOB: 1985  Date of evaluation: 10/6/2021  Provider: Fadi Morris DO    CHIEF COMPLAINT       Chief Complaint   Patient presents with   • Abdominal Pain         HISTORY OF PRESENT ILLNESS  (Location/Symptom, Timing/Onset, Context/Setting, Quality, Duration, Modifying Factors, Severity.)   Aneudy Mckeno is a 36 y.o. male who presents to the emergency department for evaluation of epigastric right upper quadrant pain which has been on and off for the last 1.5 days.  He notes the pain is mainly located in the right upper quadrant, is associated after eating, had an episode this morning after breakfast were he has some pretty significant pain with mild nausea, no vomiting.  He denies any fever or chills, no history of known GI disorders in the past, no history of pancreatitis, does not drink alcohol.  No known gallbladder disease.  He does have a family history with both of his parents with a need for gallbladder removal.  The patient denies any chest pain cough congestion fever chills or recent illness.  He denies any prior evaluation for similar symptoms in the past.  Patient has no other acute systemic complaints this time.  His pain is significantly improved but is still having mild discomfort in this region.      Nursing notes were reviewed.    REVIEW OF SYSTEMS    (2-9 systems for level 4, 10 or more for level 5)   ROS:  General:  No fevers, no chills, no weakness  Cardiovascular:  No chest pain, no palpitations  Respiratory:  No shortness of breath, no cough, no wheezing  Gastrointestinal: As epigastric, right upper quadrant pain, + nausea, no vomiting, no diarrhea  Musculoskeletal:  No muscle pain, no joint pain  Skin:  No rash  Neurologic:  No headache  Psychiatric:  No anxiety  Genitourinary:  No dysuria, no hematuria    Except as noted above the remainder of the review of systems was  reviewed and negative.       PAST MEDICAL HISTORY     Past Medical History:   Diagnosis Date   • Allergy    • GERD (gastroesophageal reflux disease)    • Grade I diastolic dysfunction 10/6/2021   • Scoliosis          SURGICAL HISTORY       Past Surgical History:   Procedure Laterality Date   • DENTAL PROCEDURE           CURRENT MEDICATIONS       Current Facility-Administered Medications:   •  acetaminophen (TYLENOL) tablet 650 mg, 650 mg, Oral, Q6H PRN, Deisy Spring, DO, 650 mg at 10/06/21 2350  •  HYDROmorphone (DILAUDID) injection 0.25 mg, 0.25 mg, Intravenous, Q4H PRN **AND** naloxone (NARCAN) injection 0.4 mg, 0.4 mg, Intravenous, Q5 Min PRN, Deisy Spring, DO  •  ondansetron (ZOFRAN) injection 4 mg, 4 mg, Intravenous, Q30 Min PRN, Fadi Morris DO  •  pantoprazole (PROTONIX) injection 40 mg, 40 mg, Intravenous, Daily, Deisy Spring, DO, 40 mg at 10/06/21 2351  •  piperacillin-tazobactam (ZOSYN) 3.375 g in iso-osmotic dextrose 50 ml (premix), 3.375 g, Intravenous, Once, Deisy Spring, DO  •  piperacillin-tazobactam (ZOSYN) 3.375 g in iso-osmotic dextrose 50 ml (premix), 3.375 g, Intravenous, Q8H, Deisy Spring, DO  •  Sodium Chloride (PF) 0.9 % 10 mL, 10 mL, Intravenous, PRN, Sincere Resendiz MD  •  sodium chloride 0.9 % bolus 1,000 mL, 1,000 mL, Intravenous, PRN, Deisy Spring J, DO  •  sodium chloride 0.9 % flush 10 mL, 10 mL, Intravenous, Q12H, Deisy Spring, DO  •  sodium chloride 0.9 % flush 10 mL, 10 mL, Intravenous, PRN, Constantin Springa J, DO  •  sodium chloride 0.9 % infusion, 125 mL/hr, Intravenous, Continuous, Fadi Morris DO, Last Rate: 125 mL/hr at 10/06/21 2222, 125 mL/hr at 10/06/21 2222    ALLERGIES     Benadryl [diphenhydramine]    FAMILY HISTORY       Family History   Problem Relation Age of Onset   • Diabetes type II Mother    • Diabetes type II Father    • Colon cancer Paternal Grandmother    • Diabetes type II Paternal Grandfather    • Coronary artery disease Paternal  "Grandfather    • No Known Problems Brother           SOCIAL HISTORY       Social History     Socioeconomic History   • Marital status:      Spouse name: Not on file   • Number of children: Not on file   • Years of education: Not on file   • Highest education level: Not on file   Tobacco Use   • Smoking status: Never Smoker   • Smokeless tobacco: Never Used   Substance and Sexual Activity   • Alcohol use: No   • Drug use: No   • Sexual activity: Defer         PHYSICAL EXAM    (up to 7 for level 4, 8 or more for level 5)     Vitals:    10/06/21 2210 10/06/21 2215 10/06/21 2300 10/06/21 2317   BP: 145/78   143/81   BP Location: Left arm   Left arm   Patient Position: Lying   Lying   Pulse: (!) 127 102 96 109   Resp: 18   18   Temp: (!) 102.2 °F (39 °C)   (!) 100.7 °F (38.2 °C)   TempSrc: Oral   Oral   SpO2: 97%   95%   Weight: 89.2 kg (196 lb 9.6 oz)      Height: 177.8 cm (70\")          Physical Exam  General : Patient is awake, alert, oriented, in no acute distress, nontoxic appearing  HEENT: Pupils are equally round and reactive to light, EOMI, conjunctivae clear, sclerae white, there is no injection no icterus.  Oral mucosa is moist, no exudate. Uvula is midline  Neck: Neck is supple, full range of motion, trachea midline  Cardiac: Heart regular rate, rhythm, no murmurs, rubs, or gallops  Lungs: Lungs are clear to auscultation, there is no wheezing, rhonchi, or rales. There is no use of accessory muscles  Chest wall: There is no tenderness to palpation over the chest wall or over ribs  Abdomen: Abdomen is soft, nondistended.  There is tenderness along the epigastric right upper quadrant region, positive Hatfield sign, no peritoneal signs on examination.  Bowel sounds present throughout.  Musculoskeletal: 5 out of 5 strength in all 4 extremities.  No focal muscle deficits are appreciated  Neuro: Motor intact, sensory intact, level of consciousness is normal  Dermatology: Skin is warm and dry  Psych: Mentation is " grossly normal, cognition is grossly normal. Affect is appropriate.      DIAGNOSTIC RESULTS     EKG: All EKG's are interpreted by the Emergency Department Physician who either signs or Co-signs this chart in the absence of a cardiologist.    ECG 12 Lead   Preliminary Result   Test Reason : hx of diastolic dysfunction   Blood Pressure :   */*   mmHG   Vent. Rate : 103 BPM     Atrial Rate : 103 BPM      P-R Int :   * ms          QRS Dur :  92 ms       QT Int : 502 ms       P-R-T Axes :   *  45  48 degrees      QTc Int : 657 ms      Accelerated Junctional rhythm   Incomplete right bundle branch block   Prolonged QT   Abnormal ECG   When compared with ECG of 06-OCT-2021 16:41, (Unconfirmed)   Junctional rhythm has replaced Sinus rhythm   Incomplete right bundle branch block is now present   QT has lengthened      Referred By:            Confirmed By:           RADIOLOGY:   Non-plain film images such as CT, Ultrasound and MRI are read by the radiologist. Plain radiographic images are visualized and preliminarily interpreted by the emergency physician with the below findings:      [] Radiologist's Report Reviewed:  US Gallbladder   Final Result   Multiple shadowing gallstones within the gallbladder. There is no wall thickening, pericholecystic fluid or biliary ductal dilatation appreciated. Technologist does not indicate whether not there is a sonographic Hatfield sign. Please correlate further   clinically.      Signer Name: Kaylah Nova MD    Signed: 10/6/2021 7:53 PM    Workstation Name: [email or web address]     [facility]      CT Abdomen Pelvis With Contrast   Preliminary Result   A few shotty right lower quadrant lymph nodes of   questionable significance. No evidence of acute inflammatory focus or   other clearly acute intra-abdominal or intrapelvic disease is seen.       DICTATED:   10/06/2021   EDITED/ls :   10/06/2021                    ED BEDSIDE ULTRASOUND:   Performed by ED Physician -  none    LABS:    I have reviewed and interpreted all of the currently available lab results from this visit (if applicable):  Results for orders placed or performed during the hospital encounter of 10/06/21   Comprehensive Metabolic Panel    Specimen: Blood   Result Value Ref Range    Glucose 165 (H) 65 - 99 mg/dL    BUN 15 6 - 20 mg/dL    Creatinine 1.01 0.76 - 1.27 mg/dL    Sodium 141 136 - 145 mmol/L    Potassium 4.2 3.5 - 5.2 mmol/L    Chloride 104 98 - 107 mmol/L    CO2 25.0 22.0 - 29.0 mmol/L    Calcium 10.0 8.6 - 10.5 mg/dL    Total Protein 7.7 6.0 - 8.5 g/dL    Albumin 4.80 3.50 - 5.20 g/dL    ALT (SGPT) 203 (H) 1 - 41 U/L    AST (SGOT) 190 (H) 1 - 40 U/L    Alkaline Phosphatase 96 39 - 117 U/L    Total Bilirubin 1.6 (H) 0.0 - 1.2 mg/dL    eGFR Non African Amer 85 >60 mL/min/1.73    Globulin 2.9 gm/dL    A/G Ratio 1.7 g/dL    BUN/Creatinine Ratio 14.9 7.0 - 25.0    Anion Gap 12.0 5.0 - 15.0 mmol/L   Lipase    Specimen: Blood   Result Value Ref Range    Lipase 2,821 (H) 13 - 60 U/L   Urinalysis With Microscopic If Indicated (No Culture) - Urine, Clean Catch    Specimen: Urine, Clean Catch   Result Value Ref Range    Color, UA Yellow Yellow, Straw    Appearance, UA Clear Clear    pH, UA 8.0 5.0 - 8.0    Specific Gravity, UA 1.015 1.005 - 1.030    Glucose, UA Negative Negative    Ketones, UA Negative Negative    Bilirubin, UA Negative Negative    Blood, UA Negative Negative    Protein, UA Negative Negative    Leuk Esterase, UA Negative Negative    Nitrite, UA Negative Negative    Urobilinogen, UA 1.0 E.U./dL 0.2 - 1.0 E.U./dL   CBC Auto Differential    Specimen: Blood   Result Value Ref Range    WBC 14.17 (H) 3.40 - 10.80 10*3/mm3    RBC 5.06 4.14 - 5.80 10*6/mm3    Hemoglobin 15.8 13.0 - 17.7 g/dL    Hematocrit 47.0 37.5 - 51.0 %    MCV 92.9 79.0 - 97.0 fL    MCH 31.2 26.6 - 33.0 pg    MCHC 33.6 31.5 - 35.7 g/dL    RDW 11.9 (L) 12.3 - 15.4 %    RDW-SD 40.2 37.0 - 54.0 fl    MPV 10.3 6.0 - 12.0 fL    Platelets  "193 140 - 450 10*3/mm3    Neutrophil % 88.0 (H) 42.7 - 76.0 %    Lymphocyte % 4.3 (L) 19.6 - 45.3 %    Monocyte % 6.6 5.0 - 12.0 %    Eosinophil % 0.1 (L) 0.3 - 6.2 %    Basophil % 0.3 0.0 - 1.5 %    Immature Grans % 0.7 (H) 0.0 - 0.5 %    Neutrophils, Absolute 12.46 (H) 1.70 - 7.00 10*3/mm3    Lymphocytes, Absolute 0.61 (L) 0.70 - 3.10 10*3/mm3    Monocytes, Absolute 0.94 (H) 0.10 - 0.90 10*3/mm3    Eosinophils, Absolute 0.02 0.00 - 0.40 10*3/mm3    Basophils, Absolute 0.04 0.00 - 0.20 10*3/mm3    Immature Grans, Absolute 0.10 (H) 0.00 - 0.05 10*3/mm3    nRBC 0.0 0.0 - 0.2 /100 WBC   ECG 12 Lead   Result Value Ref Range    QT Interval 502 ms    QTC Interval 657 ms   Green Top (Gel)   Result Value Ref Range    Extra Tube Hold for add-ons.    Lavender Top   Result Value Ref Range    Extra Tube hold for add-on    Gold Top - SST   Result Value Ref Range    Extra Tube Hold for add-ons.    Gray Top   Result Value Ref Range    Extra Tube Hold for add-ons.         All other labs were within normal range or not returned as of this dictation.      EMERGENCY DEPARTMENT COURSE and DIFFERENTIAL DIAGNOSIS/MDM:   Vitals:    Vitals:    10/06/21 2210 10/06/21 2215 10/06/21 2300 10/06/21 2317   BP: 145/78   143/81   BP Location: Left arm   Left arm   Patient Position: Lying   Lying   Pulse: (!) 127 102 96 109   Resp: 18   18   Temp: (!) 102.2 °F (39 °C)   (!) 100.7 °F (38.2 °C)   TempSrc: Oral   Oral   SpO2: 97%   95%   Weight: 89.2 kg (196 lb 9.6 oz)      Height: 177.8 cm (70\")               Patient with epigastric right upper quadrant pain for the last 1 day.  Does have discomfort along the the right upper quadrant, positive Hatfield's sign.  He is nontoxic-appearing, no peritoneal signs on examination.  Pain is worse after eating, given the concern for GI/gallbladder disease we did obtain IV labs blood work and imaging for further evaluation.  Results reviewed as above.  Patient with acute pancreatitis, also with mild " transaminitis.  CT scan imaging ultrasound revealed no signs of acute cholecystitis, multiple gallstones, likely the underlying cause of the patient's recurrent symptoms.  I did discuss the case with Dr. Mariscal, general surgery, who recommends continue with symptomatic therapies, IV fluids, will have general surgery consultation for the morning.  Patient was updated current plan of care, he is in agreement, case discussed with the hospitalist team for admission.        PROCEDURES:  Procedures    CRITICAL CARE TIME    Total Critical Care time was 0 minutes, excluding separately reportable procedures.   There was a high probability of clinically significant/life threatening deterioration in the patient's condition which required my urgent intervention.      FINAL IMPRESSION      1. Acute pancreatitis, unspecified complication status, unspecified pancreatitis type    2. Calculus of gallbladder and bile duct without cholecystitis or obstruction    3. Upper abdominal pain          DISPOSITION/PLAN     ED Disposition     ED Disposition Condition Comment    Decision to Admit  Level of Care: Telemetry [5]   Diagnosis: Acute gallstone pancreatitis [[id number]]   Admitting Physician: THELMA AQUINO [[id number]]   Attending Physician: THELMA AQUINO [[id number]]   Bed Request Comments: CDU              Comment: Please note this report has been produced using speech recognition software.      Fadi Morris DO  Attending Emergency Physician               Fadi Morris DO  10/07/21 0014

## 2021-10-07 ENCOUNTER — ANESTHESIA EVENT (OUTPATIENT)
Dept: PERIOP | Facility: HOSPITAL | Age: 36
End: 2021-10-07

## 2021-10-07 ENCOUNTER — APPOINTMENT (OUTPATIENT)
Dept: GENERAL RADIOLOGY | Facility: HOSPITAL | Age: 36
End: 2021-10-07

## 2021-10-07 ENCOUNTER — ANESTHESIA (OUTPATIENT)
Dept: PERIOP | Facility: HOSPITAL | Age: 36
End: 2021-10-07

## 2021-10-07 PROBLEM — R03.0 ELEVATED BP WITHOUT DIAGNOSIS OF HYPERTENSION: Status: ACTIVE | Noted: 2021-10-07

## 2021-10-07 PROBLEM — R00.0 TACHYCARDIA: Status: ACTIVE | Noted: 2021-10-07

## 2021-10-07 PROBLEM — K85.90 ACUTE PANCREATITIS: Status: ACTIVE | Noted: 2021-10-07

## 2021-10-07 LAB
ALBUMIN SERPL-MCNC: 4.2 G/DL (ref 3.5–5.2)
ALBUMIN/GLOB SERPL: 1.7 G/DL
ALP SERPL-CCNC: 100 U/L (ref 39–117)
ALT SERPL W P-5'-P-CCNC: 572 U/L (ref 1–41)
AMPHET+METHAMPHET UR QL: NEGATIVE
AMPHETAMINES UR QL: NEGATIVE
ANION GAP SERPL CALCULATED.3IONS-SCNC: 13 MMOL/L (ref 5–15)
APTT PPP: 31.8 SECONDS (ref 22–39)
AST SERPL-CCNC: 333 U/L (ref 1–40)
BARBITURATES UR QL SCN: NEGATIVE
BASOPHILS # BLD AUTO: 0.02 10*3/MM3 (ref 0–0.2)
BASOPHILS NFR BLD AUTO: 0.3 % (ref 0–1.5)
BENZODIAZ UR QL SCN: NEGATIVE
BILIRUB SERPL-MCNC: 4.3 MG/DL (ref 0–1.2)
BUN SERPL-MCNC: 9 MG/DL (ref 6–20)
BUN/CREAT SERPL: 8.5 (ref 7–25)
BUPRENORPHINE SERPL-MCNC: NEGATIVE NG/ML
CALCIUM SPEC-SCNC: 8.9 MG/DL (ref 8.6–10.5)
CANNABINOIDS SERPL QL: NEGATIVE
CHLORIDE SERPL-SCNC: 105 MMOL/L (ref 98–107)
CHOLEST SERPL-MCNC: 146 MG/DL (ref 0–200)
CO2 SERPL-SCNC: 22 MMOL/L (ref 22–29)
COCAINE UR QL: NEGATIVE
CREAT SERPL-MCNC: 1.06 MG/DL (ref 0.76–1.27)
D-LACTATE SERPL-SCNC: 0.9 MMOL/L (ref 0.5–2)
DEPRECATED RDW RBC AUTO: 39.8 FL (ref 37–54)
EOSINOPHIL # BLD AUTO: 0.01 10*3/MM3 (ref 0–0.4)
EOSINOPHIL NFR BLD AUTO: 0.1 % (ref 0.3–6.2)
ERYTHROCYTE [DISTWIDTH] IN BLOOD BY AUTOMATED COUNT: 12.2 % (ref 12.3–15.4)
GFR SERPL CREATININE-BSD FRML MDRD: 80 ML/MIN/1.73
GLOBULIN UR ELPH-MCNC: 2.5 GM/DL
GLUCOSE SERPL-MCNC: 114 MG/DL (ref 65–99)
HBA1C MFR BLD: 4.9 % (ref 4.8–5.6)
HCT VFR BLD AUTO: 40.9 % (ref 37.5–51)
HDLC SERPL-MCNC: 51 MG/DL (ref 40–60)
HGB BLD-MCNC: 14.4 G/DL (ref 13–17.7)
IMM GRANULOCYTES # BLD AUTO: 0.03 10*3/MM3 (ref 0–0.05)
IMM GRANULOCYTES NFR BLD AUTO: 0.4 % (ref 0–0.5)
INR PPP: 1.11 (ref 0.85–1.16)
LDLC SERPL CALC-MCNC: 78 MG/DL (ref 0–100)
LDLC/HDLC SERPL: 1.51 {RATIO}
LIPASE SERPL-CCNC: 71 U/L (ref 13–60)
LYMPHOCYTES # BLD AUTO: 0.4 10*3/MM3 (ref 0.7–3.1)
LYMPHOCYTES NFR BLD AUTO: 5.3 % (ref 19.6–45.3)
MAGNESIUM SERPL-MCNC: 1.5 MG/DL (ref 1.6–2.6)
MCH RBC QN AUTO: 31.6 PG (ref 26.6–33)
MCHC RBC AUTO-ENTMCNC: 35.2 G/DL (ref 31.5–35.7)
MCV RBC AUTO: 89.7 FL (ref 79–97)
METHADONE UR QL SCN: NEGATIVE
MONOCYTES # BLD AUTO: 0.95 10*3/MM3 (ref 0.1–0.9)
MONOCYTES NFR BLD AUTO: 12.6 % (ref 5–12)
MRSA DNA SPEC QL NAA+PROBE: NEGATIVE
NEUTROPHILS NFR BLD AUTO: 6.14 10*3/MM3 (ref 1.7–7)
NEUTROPHILS NFR BLD AUTO: 81.3 % (ref 42.7–76)
NRBC BLD AUTO-RTO: 0 /100 WBC (ref 0–0.2)
OPIATES UR QL: NEGATIVE
OXYCODONE UR QL SCN: NEGATIVE
PCP UR QL SCN: NEGATIVE
PLATELET # BLD AUTO: 160 10*3/MM3 (ref 140–450)
PMV BLD AUTO: 10.4 FL (ref 6–12)
POTASSIUM SERPL-SCNC: 3.9 MMOL/L (ref 3.5–5.2)
PROCALCITONIN SERPL-MCNC: 0.45 NG/ML (ref 0–0.25)
PROPOXYPH UR QL: NEGATIVE
PROT SERPL-MCNC: 6.7 G/DL (ref 6–8.5)
PROTHROMBIN TIME: 14 SECONDS (ref 11.4–14.4)
QT INTERVAL: 364 MS
QT INTERVAL: 370 MS
QT INTERVAL: 502 MS
QTC INTERVAL: 425 MS
QTC INTERVAL: 426 MS
QTC INTERVAL: 657 MS
RBC # BLD AUTO: 4.56 10*6/MM3 (ref 4.14–5.8)
SARS-COV-2 RDRP RESP QL NAA+PROBE: NORMAL
SODIUM SERPL-SCNC: 140 MMOL/L (ref 136–145)
TRICYCLICS UR QL SCN: NEGATIVE
TRIGL SERPL-MCNC: 89 MG/DL (ref 0–150)
VLDLC SERPL-MCNC: 17 MG/DL (ref 5–40)
WBC # BLD AUTO: 7.55 10*3/MM3 (ref 3.4–10.8)

## 2021-10-07 PROCEDURE — BF101ZZ FLUOROSCOPY OF BILE DUCTS USING LOW OSMOLAR CONTRAST: ICD-10-PCS | Performed by: SURGERY

## 2021-10-07 PROCEDURE — 85025 COMPLETE CBC W/AUTO DIFF WBC: CPT | Performed by: FAMILY MEDICINE

## 2021-10-07 PROCEDURE — 84145 PROCALCITONIN (PCT): CPT | Performed by: FAMILY MEDICINE

## 2021-10-07 PROCEDURE — 88304 TISSUE EXAM BY PATHOLOGIST: CPT | Performed by: SURGERY

## 2021-10-07 PROCEDURE — 25010000002 MAGNESIUM SULFATE 2 GM/50ML SOLUTION: Performed by: FAMILY MEDICINE

## 2021-10-07 PROCEDURE — 83690 ASSAY OF LIPASE: CPT | Performed by: FAMILY MEDICINE

## 2021-10-07 PROCEDURE — 25010000002 PIPERACILLIN SOD-TAZOBACTAM PER 1 G: Performed by: SURGERY

## 2021-10-07 PROCEDURE — 25010000002 PIPERACILLIN SOD-TAZOBACTAM PER 1 G: Performed by: NURSE ANESTHETIST, CERTIFIED REGISTERED

## 2021-10-07 PROCEDURE — 80053 COMPREHEN METABOLIC PANEL: CPT | Performed by: SURGERY

## 2021-10-07 PROCEDURE — 25010000002 ONDANSETRON PER 1 MG: Performed by: NURSE ANESTHETIST, CERTIFIED REGISTERED

## 2021-10-07 PROCEDURE — 25010000002 HYDROMORPHONE 1 MG/ML SOLUTION

## 2021-10-07 PROCEDURE — 25010000002 FENTANYL CITRATE (PF) 50 MCG/ML SOLUTION: Performed by: NURSE ANESTHETIST, CERTIFIED REGISTERED

## 2021-10-07 PROCEDURE — C1889 IMPLANT/INSERT DEVICE, NOC: HCPCS | Performed by: SURGERY

## 2021-10-07 PROCEDURE — 0FT44ZZ RESECTION OF GALLBLADDER, PERCUTANEOUS ENDOSCOPIC APPROACH: ICD-10-PCS | Performed by: SURGERY

## 2021-10-07 PROCEDURE — 25010000002 DEXAMETHASONE PER 1 MG: Performed by: NURSE ANESTHETIST, CERTIFIED REGISTERED

## 2021-10-07 PROCEDURE — 93005 ELECTROCARDIOGRAM TRACING: CPT | Performed by: FAMILY MEDICINE

## 2021-10-07 PROCEDURE — 25010000002 IOPAMIDOL 61 % SOLUTION: Performed by: SURGERY

## 2021-10-07 PROCEDURE — 87635 SARS-COV-2 COVID-19 AMP PRB: CPT | Performed by: FAMILY MEDICINE

## 2021-10-07 PROCEDURE — 25010000002 HYDROMORPHONE PER 4 MG: Performed by: SURGERY

## 2021-10-07 PROCEDURE — 25010000002 MAGNESIUM SULFATE 2 GM/50ML SOLUTION: Performed by: SURGERY

## 2021-10-07 PROCEDURE — 99233 SBSQ HOSP IP/OBS HIGH 50: CPT | Performed by: HOSPITALIST

## 2021-10-07 PROCEDURE — 83605 ASSAY OF LACTIC ACID: CPT | Performed by: FAMILY MEDICINE

## 2021-10-07 PROCEDURE — 93010 ELECTROCARDIOGRAM REPORT: CPT | Performed by: INTERNAL MEDICINE

## 2021-10-07 PROCEDURE — 83735 ASSAY OF MAGNESIUM: CPT | Performed by: FAMILY MEDICINE

## 2021-10-07 PROCEDURE — 85610 PROTHROMBIN TIME: CPT | Performed by: FAMILY MEDICINE

## 2021-10-07 PROCEDURE — 25010000002 NEOSTIGMINE 10 MG/10ML SOLUTION: Performed by: NURSE ANESTHETIST, CERTIFIED REGISTERED

## 2021-10-07 PROCEDURE — 25010000002 PIPERACILLIN SOD-TAZOBACTAM PER 1 G: Performed by: FAMILY MEDICINE

## 2021-10-07 PROCEDURE — 76000 FLUOROSCOPY <1 HR PHYS/QHP: CPT

## 2021-10-07 PROCEDURE — 85730 THROMBOPLASTIN TIME PARTIAL: CPT | Performed by: FAMILY MEDICINE

## 2021-10-07 PROCEDURE — 25010000002 FENTANYL CITRATE (PF) 50 MCG/ML SOLUTION

## 2021-10-07 PROCEDURE — 80061 LIPID PANEL: CPT | Performed by: FAMILY MEDICINE

## 2021-10-07 PROCEDURE — 87040 BLOOD CULTURE FOR BACTERIA: CPT | Performed by: FAMILY MEDICINE

## 2021-10-07 PROCEDURE — 25010000002 PROPOFOL 10 MG/ML EMULSION: Performed by: NURSE ANESTHETIST, CERTIFIED REGISTERED

## 2021-10-07 PROCEDURE — 83036 HEMOGLOBIN GLYCOSYLATED A1C: CPT | Performed by: FAMILY MEDICINE

## 2021-10-07 DEVICE — LIGAMAX 5 MM ENDOSCOPIC MULTIPLE CLIP APPLIER
Type: IMPLANTABLE DEVICE | Site: ABDOMEN | Status: FUNCTIONAL
Brand: LIGAMAX

## 2021-10-07 RX ORDER — DEXAMETHASONE SODIUM PHOSPHATE 4 MG/ML
INJECTION, SOLUTION INTRA-ARTICULAR; INTRALESIONAL; INTRAMUSCULAR; INTRAVENOUS; SOFT TISSUE AS NEEDED
Status: DISCONTINUED | OUTPATIENT
Start: 2021-10-07 | End: 2021-10-07 | Stop reason: SURG

## 2021-10-07 RX ORDER — MAGNESIUM SULFATE HEPTAHYDRATE 40 MG/ML
2 INJECTION, SOLUTION INTRAVENOUS AS NEEDED
Status: DISCONTINUED | OUTPATIENT
Start: 2021-10-07 | End: 2021-10-08 | Stop reason: HOSPADM

## 2021-10-07 RX ORDER — FENTANYL CITRATE 50 UG/ML
INJECTION, SOLUTION INTRAMUSCULAR; INTRAVENOUS
Status: COMPLETED
Start: 2021-10-07 | End: 2021-10-07

## 2021-10-07 RX ORDER — HEPARIN SODIUM 5000 [USP'U]/ML
5000 INJECTION, SOLUTION INTRAVENOUS; SUBCUTANEOUS EVERY 8 HOURS SCHEDULED
Status: DISCONTINUED | OUTPATIENT
Start: 2021-10-08 | End: 2021-10-08 | Stop reason: HOSPADM

## 2021-10-07 RX ORDER — ONDANSETRON 4 MG/1
4 TABLET, FILM COATED ORAL EVERY 6 HOURS PRN
Status: DISCONTINUED | OUTPATIENT
Start: 2021-10-07 | End: 2021-10-08 | Stop reason: HOSPADM

## 2021-10-07 RX ORDER — HYDROMORPHONE HYDROCHLORIDE 1 MG/ML
0.5 INJECTION, SOLUTION INTRAMUSCULAR; INTRAVENOUS; SUBCUTANEOUS
Status: DISCONTINUED | OUTPATIENT
Start: 2021-10-07 | End: 2021-10-07 | Stop reason: HOSPADM

## 2021-10-07 RX ORDER — BUPIVACAINE HYDROCHLORIDE AND EPINEPHRINE 2.5; 5 MG/ML; UG/ML
INJECTION, SOLUTION EPIDURAL; INFILTRATION; INTRACAUDAL; PERINEURAL AS NEEDED
Status: DISCONTINUED | OUTPATIENT
Start: 2021-10-07 | End: 2021-10-07 | Stop reason: HOSPADM

## 2021-10-07 RX ORDER — MIDAZOLAM HYDROCHLORIDE 1 MG/ML
1 INJECTION INTRAMUSCULAR; INTRAVENOUS
Status: DISCONTINUED | OUTPATIENT
Start: 2021-10-07 | End: 2021-10-07 | Stop reason: HOSPADM

## 2021-10-07 RX ORDER — LIDOCAINE HYDROCHLORIDE 10 MG/ML
0.5 INJECTION, SOLUTION EPIDURAL; INFILTRATION; INTRACAUDAL; PERINEURAL ONCE AS NEEDED
Status: DISCONTINUED | OUTPATIENT
Start: 2021-10-07 | End: 2021-10-07 | Stop reason: HOSPADM

## 2021-10-07 RX ORDER — LIDOCAINE HYDROCHLORIDE 10 MG/ML
INJECTION, SOLUTION EPIDURAL; INFILTRATION; INTRACAUDAL; PERINEURAL AS NEEDED
Status: DISCONTINUED | OUTPATIENT
Start: 2021-10-07 | End: 2021-10-07 | Stop reason: SURG

## 2021-10-07 RX ORDER — SODIUM CHLORIDE 0.9 % (FLUSH) 0.9 %
10 SYRINGE (ML) INJECTION AS NEEDED
Status: DISCONTINUED | OUTPATIENT
Start: 2021-10-07 | End: 2021-10-07 | Stop reason: HOSPADM

## 2021-10-07 RX ORDER — ONDANSETRON 2 MG/ML
INJECTION INTRAMUSCULAR; INTRAVENOUS AS NEEDED
Status: DISCONTINUED | OUTPATIENT
Start: 2021-10-07 | End: 2021-10-07 | Stop reason: SURG

## 2021-10-07 RX ORDER — DOCUSATE SODIUM 100 MG/1
100 CAPSULE, LIQUID FILLED ORAL 2 TIMES DAILY PRN
Status: DISCONTINUED | OUTPATIENT
Start: 2021-10-07 | End: 2021-10-08 | Stop reason: HOSPADM

## 2021-10-07 RX ORDER — PROPOFOL 10 MG/ML
VIAL (ML) INTRAVENOUS AS NEEDED
Status: DISCONTINUED | OUTPATIENT
Start: 2021-10-07 | End: 2021-10-07 | Stop reason: SURG

## 2021-10-07 RX ORDER — ONDANSETRON 2 MG/ML
4 INJECTION INTRAMUSCULAR; INTRAVENOUS ONCE AS NEEDED
Status: DISCONTINUED | OUTPATIENT
Start: 2021-10-07 | End: 2021-10-07 | Stop reason: HOSPADM

## 2021-10-07 RX ORDER — FAMOTIDINE 20 MG/1
20 TABLET, FILM COATED ORAL ONCE
Status: DISCONTINUED | OUTPATIENT
Start: 2021-10-07 | End: 2021-10-07 | Stop reason: HOSPADM

## 2021-10-07 RX ORDER — HYDROMORPHONE HYDROCHLORIDE 1 MG/ML
0.5 INJECTION, SOLUTION INTRAMUSCULAR; INTRAVENOUS; SUBCUTANEOUS
Status: DISCONTINUED | OUTPATIENT
Start: 2021-10-07 | End: 2021-10-08 | Stop reason: HOSPADM

## 2021-10-07 RX ORDER — NALOXONE HCL 0.4 MG/ML
0.4 VIAL (ML) INJECTION
Status: DISCONTINUED | OUTPATIENT
Start: 2021-10-07 | End: 2021-10-08 | Stop reason: HOSPADM

## 2021-10-07 RX ORDER — MORPHINE SULFATE 2 MG/ML
2 INJECTION, SOLUTION INTRAMUSCULAR; INTRAVENOUS
Status: DISCONTINUED | OUTPATIENT
Start: 2021-10-07 | End: 2021-10-08 | Stop reason: HOSPADM

## 2021-10-07 RX ORDER — SODIUM CHLORIDE, SODIUM LACTATE, POTASSIUM CHLORIDE, CALCIUM CHLORIDE 600; 310; 30; 20 MG/100ML; MG/100ML; MG/100ML; MG/100ML
9 INJECTION, SOLUTION INTRAVENOUS CONTINUOUS
Status: DISCONTINUED | OUTPATIENT
Start: 2021-10-07 | End: 2021-10-08 | Stop reason: HOSPADM

## 2021-10-07 RX ORDER — NEOSTIGMINE METHYLSULFATE 1 MG/ML
INJECTION, SOLUTION INTRAVENOUS AS NEEDED
Status: DISCONTINUED | OUTPATIENT
Start: 2021-10-07 | End: 2021-10-07 | Stop reason: SURG

## 2021-10-07 RX ORDER — FENTANYL CITRATE 50 UG/ML
50 INJECTION, SOLUTION INTRAMUSCULAR; INTRAVENOUS
Status: DISCONTINUED | OUTPATIENT
Start: 2021-10-07 | End: 2021-10-07 | Stop reason: HOSPADM

## 2021-10-07 RX ORDER — SODIUM CHLORIDE 9 MG/ML
INJECTION, SOLUTION INTRAVENOUS AS NEEDED
Status: DISCONTINUED | OUTPATIENT
Start: 2021-10-07 | End: 2021-10-07 | Stop reason: HOSPADM

## 2021-10-07 RX ORDER — ROCURONIUM BROMIDE 10 MG/ML
INJECTION, SOLUTION INTRAVENOUS AS NEEDED
Status: DISCONTINUED | OUTPATIENT
Start: 2021-10-07 | End: 2021-10-07 | Stop reason: SURG

## 2021-10-07 RX ORDER — OXYCODONE HYDROCHLORIDE AND ACETAMINOPHEN 5; 325 MG/1; MG/1
2 TABLET ORAL EVERY 4 HOURS PRN
Status: DISCONTINUED | OUTPATIENT
Start: 2021-10-07 | End: 2021-10-08 | Stop reason: HOSPADM

## 2021-10-07 RX ORDER — FAMOTIDINE 20 MG/1
20 TABLET, FILM COATED ORAL ONCE
Status: COMPLETED | OUTPATIENT
Start: 2021-10-07 | End: 2021-10-07

## 2021-10-07 RX ORDER — SODIUM CHLORIDE, SODIUM LACTATE, POTASSIUM CHLORIDE, CALCIUM CHLORIDE 600; 310; 30; 20 MG/100ML; MG/100ML; MG/100ML; MG/100ML
125 INJECTION, SOLUTION INTRAVENOUS CONTINUOUS
Status: DISCONTINUED | OUTPATIENT
Start: 2021-10-07 | End: 2021-10-08 | Stop reason: HOSPADM

## 2021-10-07 RX ORDER — FENTANYL CITRATE 50 UG/ML
INJECTION, SOLUTION INTRAMUSCULAR; INTRAVENOUS AS NEEDED
Status: DISCONTINUED | OUTPATIENT
Start: 2021-10-07 | End: 2021-10-07 | Stop reason: SURG

## 2021-10-07 RX ORDER — MAGNESIUM SULFATE HEPTAHYDRATE 40 MG/ML
4 INJECTION, SOLUTION INTRAVENOUS AS NEEDED
Status: DISCONTINUED | OUTPATIENT
Start: 2021-10-07 | End: 2021-10-08 | Stop reason: HOSPADM

## 2021-10-07 RX ORDER — NALOXONE HCL 0.4 MG/ML
0.1 VIAL (ML) INJECTION
Status: DISCONTINUED | OUTPATIENT
Start: 2021-10-07 | End: 2021-10-08 | Stop reason: HOSPADM

## 2021-10-07 RX ORDER — ONDANSETRON 2 MG/ML
4 INJECTION INTRAMUSCULAR; INTRAVENOUS EVERY 6 HOURS PRN
Status: DISCONTINUED | OUTPATIENT
Start: 2021-10-07 | End: 2021-10-08 | Stop reason: HOSPADM

## 2021-10-07 RX ORDER — GLYCOPYRROLATE 0.2 MG/ML
INJECTION INTRAMUSCULAR; INTRAVENOUS AS NEEDED
Status: DISCONTINUED | OUTPATIENT
Start: 2021-10-07 | End: 2021-10-07 | Stop reason: SURG

## 2021-10-07 RX ORDER — SODIUM CHLORIDE 0.9 % (FLUSH) 0.9 %
10 SYRINGE (ML) INJECTION EVERY 12 HOURS SCHEDULED
Status: DISCONTINUED | OUTPATIENT
Start: 2021-10-07 | End: 2021-10-07 | Stop reason: HOSPADM

## 2021-10-07 RX ADMIN — ROCURONIUM BROMIDE 50 MG: 10 INJECTION INTRAVENOUS at 16:09

## 2021-10-07 RX ADMIN — HYDROMORPHONE HYDROCHLORIDE 0.5 MG: 1 INJECTION, SOLUTION INTRAMUSCULAR; INTRAVENOUS; SUBCUTANEOUS at 18:25

## 2021-10-07 RX ADMIN — SODIUM CHLORIDE 125 ML/HR: 9 INJECTION, SOLUTION INTRAVENOUS at 13:21

## 2021-10-07 RX ADMIN — TAZOBACTAM SODIUM AND PIPERACILLIN SODIUM 3.38 G: 375; 3 INJECTION, SOLUTION INTRAVENOUS at 13:23

## 2021-10-07 RX ADMIN — MAGNESIUM SULFATE HEPTAHYDRATE 2 G: 2 INJECTION, SOLUTION INTRAVENOUS at 05:38

## 2021-10-07 RX ADMIN — TAZOBACTAM SODIUM AND PIPERACILLIN SODIUM 3.38 G: 375; 3 INJECTION, SOLUTION INTRAVENOUS at 00:43

## 2021-10-07 RX ADMIN — PROPOFOL 200 MG: 10 INJECTION, EMULSION INTRAVENOUS at 16:09

## 2021-10-07 RX ADMIN — ACETAMINOPHEN 650 MG: 325 TABLET, FILM COATED ORAL at 23:25

## 2021-10-07 RX ADMIN — ONDANSETRON 4 MG: 2 INJECTION INTRAMUSCULAR; INTRAVENOUS at 16:09

## 2021-10-07 RX ADMIN — TAZOBACTAM SODIUM AND PIPERACILLIN SODIUM 3.38 G: 375; 3 INJECTION, SOLUTION INTRAVENOUS at 05:38

## 2021-10-07 RX ADMIN — LIDOCAINE HYDROCHLORIDE 50 MG: 10 INJECTION, SOLUTION EPIDURAL; INFILTRATION; INTRACAUDAL; PERINEURAL at 16:09

## 2021-10-07 RX ADMIN — TAZOBACTAM SODIUM AND PIPERACILLIN SODIUM 3.38 G: 375; 3 INJECTION, SOLUTION INTRAVENOUS at 16:18

## 2021-10-07 RX ADMIN — DEXAMETHASONE SODIUM PHOSPHATE 8 MG: 4 INJECTION, SOLUTION INTRA-ARTICULAR; INTRALESIONAL; INTRAMUSCULAR; INTRAVENOUS; SOFT TISSUE at 16:09

## 2021-10-07 RX ADMIN — FENTANYL CITRATE 50 MCG: 50 INJECTION INTRAMUSCULAR; INTRAVENOUS at 18:00

## 2021-10-07 RX ADMIN — SODIUM CHLORIDE 125 ML/HR: 9 INJECTION, SOLUTION INTRAVENOUS at 05:38

## 2021-10-07 RX ADMIN — DEXAMETHASONE SODIUM PHOSPHATE 4 MG: 4 INJECTION, SOLUTION INTRA-ARTICULAR; INTRALESIONAL; INTRAMUSCULAR; INTRAVENOUS; SOFT TISSUE at 16:18

## 2021-10-07 RX ADMIN — MAGNESIUM SULFATE HEPTAHYDRATE 2 G: 2 INJECTION, SOLUTION INTRAVENOUS at 03:19

## 2021-10-07 RX ADMIN — NEOSTIGMINE 4 MG: 1 INJECTION INTRAVENOUS at 17:12

## 2021-10-07 RX ADMIN — HYDROMORPHONE HYDROCHLORIDE 0.5 MG: 1 INJECTION, SOLUTION INTRAMUSCULAR; INTRAVENOUS; SUBCUTANEOUS at 20:54

## 2021-10-07 RX ADMIN — FENTANYL CITRATE 50 MCG: 50 INJECTION INTRAMUSCULAR; INTRAVENOUS at 17:40

## 2021-10-07 RX ADMIN — SODIUM CHLORIDE, POTASSIUM CHLORIDE, SODIUM LACTATE AND CALCIUM CHLORIDE 125 ML/HR: 600; 310; 30; 20 INJECTION, SOLUTION INTRAVENOUS at 18:55

## 2021-10-07 RX ADMIN — MAGNESIUM SULFATE HEPTAHYDRATE 2 G: 2 INJECTION, SOLUTION INTRAVENOUS at 10:16

## 2021-10-07 RX ADMIN — SODIUM CHLORIDE, POTASSIUM CHLORIDE, SODIUM LACTATE AND CALCIUM CHLORIDE: 600; 310; 30; 20 INJECTION, SOLUTION INTRAVENOUS at 16:05

## 2021-10-07 RX ADMIN — FENTANYL CITRATE 100 MCG: 50 INJECTION, SOLUTION INTRAMUSCULAR; INTRAVENOUS at 16:09

## 2021-10-07 RX ADMIN — TAZOBACTAM SODIUM AND PIPERACILLIN SODIUM 3.38 G: 375; 3 INJECTION, SOLUTION INTRAVENOUS at 20:02

## 2021-10-07 RX ADMIN — SODIUM CHLORIDE, PRESERVATIVE FREE 10 ML: 5 INJECTION INTRAVENOUS at 20:02

## 2021-10-07 RX ADMIN — FAMOTIDINE 20 MG: 20 TABLET ORAL at 14:33

## 2021-10-07 RX ADMIN — GLYCOPYRROLATE 0.4 MG: 0.2 INJECTION INTRAMUSCULAR; INTRAVENOUS at 17:12

## 2021-10-07 NOTE — ANESTHESIA PREPROCEDURE EVALUATION
" Anesthesia Evaluation     Patient summary reviewed and Nursing notes reviewed   no history of anesthetic complications:  NPO Solid Status: > 8 hours  NPO Liquid Status: > 8 hours           Airway   Mallampati: II  TM distance: <3 FB  Neck ROM: full  Possible difficult intubation  Dental - normal exam     Pulmonary - negative pulmonary ROS and normal exam   Cardiovascular - normal exam  Exercise tolerance: good (4-7 METS)    ECG reviewed    (+) dysrhythmias (occasional \"skipped beat\"),   (-) hypertension, angina, POLLARD      Neuro/Psych- negative ROS  GI/Hepatic/Renal/Endo    (+)  GERD well controlled,    (-) liver disease, no renal disease, diabetes, no thyroid disorder    Musculoskeletal (-) negative ROS    Abdominal    Substance History      OB/GYN          Other - negative ROS                       Anesthesia Plan    ASA 2     general     intravenous induction     Anesthetic plan, all risks, benefits, and alternatives have been provided, discussed and informed consent has been obtained with: patient.    Plan discussed with CRNA.      "

## 2021-10-07 NOTE — PLAN OF CARE
Goal Outcome Evaluation:           Progress: no change  Outcome Summary: VSS. No complaints of pain. Wife in room. NPO. Currently gone to surgery

## 2021-10-07 NOTE — H&P
"    [facility] Medicine Services  HISTORY AND PHYSICAL    Patient Name: Aneudy Mckeon  : 1985  MRN: [id number]  Primary Care Physician: Provider, No Known  Date of admission: 10/6/2021      Subjective   Subjective     Chief Complaint:  ABD PAIN     HPI:  Aneudy Mckeon is a 36 y.o. male with past medical history of scoliosis, GERD, allergies and recent echo noting Grade I diastolic dysfunction.  Patient presented to [facility] ED with worsening right upper quadrant pain.  Patient states he had similar pain at a lesser severity that occurs every few months. In the past the pain would resolve quickly.  Pt reports that he had eaten a grilled chicken biscuit and and hash browns.  When he noted severe pain in ruq and under right ribs.  He states that the pain was a throbbing and sharp pain. Lasted 3-4 hours. No relief with heating pad. Therefore due to the severity of pain patient decided to come to the ED.  Upon arrival patient was noted to have a lipase of 2,821.  Bilirubin of 1.6, AST of 203, ALT of 190 and WBC of 14.17. CT of abd and pelvis was noncontributory except for a few shotty lymph nodes in the right upper quadrant.  Gallbladder ultrasound noted \"Multiple shadowing gallstones within the gallbladder. There is no wall thickening, pericholecystic fluid or biliary ductal dilatation appreciated\". Due to patient's symptoms and fever with elevated WBC count decision was made to admit patient to hospitalist service for further evaluation management.      COVID Details:    Symptoms:    [] NONE [x] Fever []  Cough [] Shortness of breath [] Change in taste/smell      The patient qualifies to receive the vaccine, but they have not yet received it.      Review of Systems   Constitutional: Positive for chills and fever. Negative for fatigue.        Rigor    HENT: Negative for congestion, ear pain, rhinorrhea and sore throat.    Respiratory: Negative for cough, shortness of breath and " wheezing.    Cardiovascular: Negative for chest pain, palpitations and leg swelling.   Gastrointestinal: Positive for abdominal distention and abdominal pain. Negative for blood in stool, constipation, diarrhea, nausea and vomiting.        Pain seemed to increase when strained to have a BM.    Genitourinary: Positive for flank pain (radiates into right flank ). Negative for dysuria and hematuria.   Musculoskeletal: Negative for back pain.   Skin: Negative.    Neurological: Positive for tremors, light-headedness and headaches (with fever ). Negative for dizziness.   Psychiatric/Behavioral: Negative for dysphoric mood and sleep disturbance. The patient is not nervous/anxious.         All other systems reviewed and are negative.     Personal History     Past Medical History:   Diagnosis Date   • Allergy    • GERD (gastroesophageal reflux disease)    • Grade I diastolic dysfunction 10/6/2021   • Scoliosis        Past Surgical History:   Procedure Laterality Date   • DENTAL PROCEDURE         Family History:  family history includes Colon cancer in his paternal grandmother; Coronary artery disease in his paternal grandfather; Diabetes type II in his father, mother, and paternal grandfather; No Known Problems in his brother. Otherwise pertinent FHx was reviewed and unremarkable.     Social History:  reports that he has never smoked. He has never used smokeless tobacco. He reports that he does not drink alcohol and does not use drugs.  Social History     Social History Narrative    caffeine use: tea occassionally       Medications:  Available home medication information reviewed.  Medications Prior to Admission   Medication Sig Dispense Refill Last Dose   • Fexofenadine HCl (ALLEGRA ALLERGY PO) Take  by mouth.   10/6/2021 at Unknown time   • Multiple Vitamin (MULTI-VITAMIN DAILY PO) Take  by mouth.   10/6/2021 at Unknown time   • omeprazole (priLOSEC) 20 MG capsule Take 20 mg by mouth Daily.   10/6/2021 at Unknown time   •  Probiotic Product (PROBIOTIC-10 PO) Take  by mouth.   10/6/2021 at Unknown time       Allergies   Allergen Reactions   • Benadryl [Diphenhydramine] Rash       Objective   Objective     Vital Signs:   Temp:  [97.8 °F (36.6 °C)-102.2 °F (39 °C)] 102.2 °F (39 °C)  Heart Rate:  [] 102  Resp:  [16-18] 18  BP: (131-151)/(76-89) 145/78       Physical Exam  Vitals and nursing note reviewed.   Constitutional:       General: He is not in acute distress.     Appearance: He is ill-appearing. He is not diaphoretic.      Comments: Flushed    HENT:      Head: Atraumatic.      Right Ear: External ear normal.      Left Ear: External ear normal.   Eyes:      General:         Right eye: No discharge.         Left eye: No discharge.      Conjunctiva/sclera: Conjunctivae normal.   Cardiovascular:      Rate and Rhythm: Normal rate and regular rhythm.      Heart sounds: No murmur heard.     Pulmonary:      Effort: Pulmonary effort is normal.      Breath sounds: Normal breath sounds. No wheezing or rales.   Abdominal:      General: Bowel sounds are normal. There is no distension.      Palpations: Abdomen is soft.      Tenderness: There is abdominal tenderness in the right upper quadrant.   Musculoskeletal:      Right lower leg: No edema.      Left lower leg: No edema.   Skin:     Findings: No rash.   Neurological:      Mental Status: He is alert and oriented to person, place, and time.            Result Review:  I have personally reviewed the results from the time of this admission to 10/6/2021 22:37 EDT and agree with these findings:  [x]  Laboratory  []  Microbiology  [x]  Radiology  []  EKG/Telemetry   []  Cardiology/Vascular   []  Pathology  []  Old records  []  Other:  Most notable findings include:       LAB RESULTS:      Lab 10/06/21  1648   WBC 14.17*   HEMOGLOBIN 15.8   HEMATOCRIT 47.0   PLATELETS 193   NEUTROS ABS 12.46*   IMMATURE GRANS (ABS) 0.10*   LYMPHS ABS 0.61*   MONOS ABS 0.94*   EOS ABS 0.02   MCV 92.9         Lab  10/06/21  1648   SODIUM 141   POTASSIUM 4.2   CHLORIDE 104   CO2 25.0   ANION GAP 12.0   BUN 15   CREATININE 1.01   GLUCOSE 165*   CALCIUM 10.0         Lab 10/06/21  1648   TOTAL PROTEIN 7.7   ALBUMIN 4.80   GLOBULIN 2.9   ALT (SGPT) 203*   AST (SGOT) 190*   BILIRUBIN 1.6*   ALK PHOS 96   LIPASE 2,821*                     UA    Urinalysis 10/6/21   Specific [place], UA 1.015   Ketones, UA Negative   Blood, UA Negative   Leukocytes, UA Negative   Nitrite, UA Negative             Microbiology Results (last 10 days)     ** No results found for the last 240 hours. **          CT Abdomen Pelvis With Contrast    Result Date: 10/6/2021  EXAMINATION: CT ABDOMEN/PELVIS W CONTRAST - 10/06/2021  INDICATION: Nausea and vomiting. Right upper quadrant pain.  TECHNIQUE: 5 mm post IV contrast portal venous phase and delayed venous phase images through the abdomen and pelvis.  The radiation dose reduction device was turned on for each scan per the ALARA (As Low as Reasonably Achievable) protocol.  COMPARISON: None  FINDINGS: History indicates abdominal pain, nausea and vomiting.  The included lower lungs appear grossly clear. There is mild diffuse fatty liver change. No significant abnormalities are seen of the gallbladder, spleen, pancreas, adrenal glands, or kidneys. No upper abdominal adenopathy, ascites or acute inflammatory change is appreciated. Regarding the lower abdomen and pelvis, bowel loops are normal in caliber and normal in appearance. There is a normal-appearing appendix, terminal ileum and cecum. There are shotty right lower quadrant lymph nodes, the largest which are up to 7 mm in diameter. No retroperitoneal or mesenteric adenopathy is seen. Delayed venous phase images show no evidence of obstructive uropathy.      Impression: A few shotty right lower quadrant lymph nodes of questionable significance. No evidence of acute inflammatory focus or other clearly acute intra-abdominal or intrapelvic disease is seen.   DICTATED:   10/06/2021 EDITED/ls :   10/06/2021       US Gallbladder    Result Date: 10/6/2021  US Abdomen Ltd INDICATION: Right upper quadrant pain for a week. Epigastric pain. COMPARISON: CT abdomen pelvis from 6:01 PM. FINDINGS: PANCREAS: Obscured by overlying bowel gas. Please refer to the earlier CT scan report. LIVER: No focal mass appreciated on images provided. No intrahepatic biliary ductal dilatation. Common bile duct is 2.2 mm which is normal. I do not see images to evaluate the inferior vena cava or main portal vein. There appear patent on the earlier CT scan. GALLBLADDER: There are multiple shadowing gallstones within the gallbladder. Technologist does not indicate whether or not there is a sonographic Hatfield's sign. Gallbladder wall is not thickened and there is no evidence for pericholecystic fluid. RIGHT KIDNEY: The right kidney measures 9.4 x 5.5 x 5.2 cm dimension. No hydronephrosis. cm..     Impression: Multiple shadowing gallstones within the gallbladder. There is no wall thickening, pericholecystic fluid or biliary ductal dilatation appreciated. Technologist does not indicate whether not there is a sonographic Hatfield sign. Please correlate further clinically. Signer Name: Kaylah Nova MD  Signed: 10/6/2021 7:53 PM  Workstation Name: [email or web address]  [facility]      Results for orders placed during the hospital encounter of 02/21/20    Adult Transthoracic Echo Complete W/ Cont if Necessary Per Protocol    Interpretation Summary  · Estimated EF appears to be in the range of 61 - 65%.  · Left ventricular systolic function is normal.  · Left ventricular diastolic dysfunction (grade I) consistent with impaired relaxation.  · No significant structural or functional valvular disease.      Assessment/Plan   Assessment & Plan     Active Hospital Problems    Diagnosis  POA   • **Sepsis, unspecified organism (HCC) [A41.9]  Unknown   • Acute gallstone pancreatitis [K85.10]  Yes      Priority: High   • Elevated BP without diagnosis of hypertension [R03.0]  Unknown   • Tachycardia [R00.0]  Unknown   • Fever [R50.9]  Unknown   • Grade I diastolic dysfunction [I51.89]  Unknown     PLAN:  -Admit patient to telemetry  -Hydrate aggressively with IV fluids, obtain blood cultures x2, check lactic and procalcitonin, will start IV Zosyn  -Consult Dr. Mariscal in the a.m.  -We will monitor fingerstick blood sugars and cover with sliding scale insulin as indicated.  -Check A1c and lipid panel in the a.m.  -Keep patient n.p.o. tonight  -Dilaudid for pain control  --Covid swab pending.   --Increased heart rate with history of grade 1 diastolic dysfunction on previous echocardiogram.  We will obtain EKG. And monitor on Tele      DVT prophylaxis:  scds      CODE STATUS:    Code Status and Medical Interventions:   Ordered at: 10/06/21 7307     Code Status:    CPR     Medical Interventions (Level of Support Prior to Arrest):    Full       Admission Status:  I believe this patient meets INPATIENT status due to acute gallstone pancreatitis .  I feel patient’s risk for adverse outcomes and need for care warrant INPATIENT evaluation and I predict the patient’s care encounter to likely last beyond 2 midnights.      Deisy Spring, DO  10/06/21

## 2021-10-07 NOTE — PROGRESS NOTES
[facility] Medicine Services  PROGRESS NOTE    Patient Name: Aneudy Mckeon  : 1985  MRN: [id number]    Date of Admission: 10/6/2021  Primary Care Physician: Provider, No Known    Subjective   Subjective     CC:  F/U abdominal pain    HPI:  Patient seen this morning, abdominal pain has improved.     ROS:  Gen-+fevers, no chills  CV-no chest pain, no palpitations  Resp-no cough, no dyspnea  GI-no N/V/D, improved abd pain    All other systems reviewed and negative except any additional pertinent positives and negatives as discussed in HPI.      Objective   Objective     Vital Signs:   Temp:  [97.8 °F (36.6 °C)-102.2 °F (39 °C)] 99.1 °F (37.3 °C)  Heart Rate:  [] 66  Resp:  [16-18] 18  BP: (131-151)/(76-89) 135/77     Physical Exam:  Gen-no acute distress  HENT-NCAT, mucous membranes moist  CV-RRR, S1 S2 normal, no m/r/g  Resp-CTAB, no wheezes or rales  Abd-soft, mild RUQ tenderness, ND, +BS  Ext-no edema  Neuro-A&Ox3, no focal deficits  Skin-no rashes  Psych-appropriate mood      Results Reviewed:  LAB RESULTS:      Lab 10/07/21  0024 10/06/21  1648   WBC  --  14.17*   HEMOGLOBIN  --  15.8   HEMATOCRIT  --  47.0   PLATELETS  --  193   NEUTROS ABS  --  12.46*   IMMATURE GRANS (ABS)  --  0.10*   LYMPHS ABS  --  0.61*   MONOS ABS  --  0.94*   EOS ABS  --  0.02   MCV  --  92.9   PROCALCITONIN 0.45*  --    LACTATE 0.9  --    PROTIME 14.0  --    APTT 31.8  --          Lab 10/07/21  0024 10/06/21  1648   SODIUM  --  141   POTASSIUM  --  4.2   CHLORIDE  --  104   CO2  --  25.0   ANION GAP  --  12.0   BUN  --  15   CREATININE  --  1.01   GLUCOSE  --  165*   CALCIUM  --  10.0   MAGNESIUM 1.5*  --          Lab 10/06/21  1648   TOTAL PROTEIN 7.7   ALBUMIN 4.80   GLOBULIN 2.9   ALT (SGPT) 203*   AST (SGOT) 190*   BILIRUBIN 1.6*   ALK PHOS 96   LIPASE 2,821*         Lab 10/07/21  0024   PROTIME 14.0   INR 1.11                 Brief Urine Lab Results  (Last result in the past 365 days)       Color   Clarity   Blood   Leuk Est   Nitrite   Protein   CREAT   Urine HCG        10/06/21 1648 Yellow Clear Negative Negative Negative Negative               Microbiology Results Abnormal     Procedure Component Value - Date/Time    COVID PRE-OP / PRE-PROCEDURE SCREENING ORDER (NO ISOLATION) - Swab, Nasopharynx [[id number]]  (Normal) Collected: 10/07/21 0325    Lab Status: Final result Specimen: Swab from Nasopharynx Updated: 10/07/21 0409    Narrative:      The following orders were created for panel order COVID PRE-OP / PRE-PROCEDURE SCREENING ORDER (NO ISOLATION) - Swab, Nasopharynx.  Procedure                               Abnormality         Status                     ---------                               -----------         ------                     COVID-19, ABBOTT IN-HOUS...[[id number]]  Normal              Final result                 Please view results for these tests on the individual orders.    COVID-19, ABBOTT IN-HOUSE,NASAL Swab (NO TRANSPORT MEDIA) 2 HR TAT - Swab, Nasopharynx [[id number]]  (Normal) Collected: 10/07/21 0325    Lab Status: Final result Specimen: Swab from Nasopharynx Updated: 10/07/21 0409     COVID19 Presumptive Negative    Narrative:      Fact sheet for providers: https://www.fda.gov/media/431188/download     Fact sheet for patients: https://www.fda.gov/media/951245/download    Test performed by PCR.  If inconsistent with clinical signs and symptoms patient should be tested with different authorized molecular test.          CT Abdomen Pelvis With Contrast    Result Date: 10/6/2021  EXAMINATION: CT ABDOMEN/PELVIS W CONTRAST - 10/06/2021  INDICATION: Nausea and vomiting. Right upper quadrant pain.  TECHNIQUE: 5 mm post IV contrast portal venous phase and delayed venous phase images through the abdomen and pelvis.  The radiation dose reduction device was turned on for each scan per the ALARA (As Low as Reasonably Achievable) protocol.  COMPARISON: None  FINDINGS: History  indicates abdominal pain, nausea and vomiting.  The included lower lungs appear grossly clear. There is mild diffuse fatty liver change. No significant abnormalities are seen of the gallbladder, spleen, pancreas, adrenal glands, or kidneys. No upper abdominal adenopathy, ascites or acute inflammatory change is appreciated. Regarding the lower abdomen and pelvis, bowel loops are normal in caliber and normal in appearance. There is a normal-appearing appendix, terminal ileum and cecum. There are shotty right lower quadrant lymph nodes, the largest which are up to 7 mm in diameter. No retroperitoneal or mesenteric adenopathy is seen. Delayed venous phase images show no evidence of obstructive uropathy.      Impression: A few shotty right lower quadrant lymph nodes of questionable significance. No evidence of acute inflammatory focus or other clearly acute intra-abdominal or intrapelvic disease is seen.  DICTATED:   10/06/2021 EDITED/ls :   10/06/2021       US Gallbladder    Result Date: 10/6/2021  US Abdomen Ltd INDICATION: Right upper quadrant pain for a week. Epigastric pain. COMPARISON: CT abdomen pelvis from 6:01 PM. FINDINGS: PANCREAS: Obscured by overlying bowel gas. Please refer to the earlier CT scan report. LIVER: No focal mass appreciated on images provided. No intrahepatic biliary ductal dilatation. Common bile duct is 2.2 mm which is normal. I do not see images to evaluate the inferior vena cava or main portal vein. There appear patent on the earlier CT scan. GALLBLADDER: There are multiple shadowing gallstones within the gallbladder. Technologist does not indicate whether or not there is a sonographic Hatfield's sign. Gallbladder wall is not thickened and there is no evidence for pericholecystic fluid. RIGHT KIDNEY: The right kidney measures 9.4 x 5.5 x 5.2 cm dimension. No hydronephrosis. cm..     Impression: Multiple shadowing gallstones within the gallbladder. There is no wall thickening, pericholecystic  fluid or biliary ductal dilatation appreciated. Technologist does not indicate whether not there is a sonographic Hatfield sign. Please correlate further clinically. Signer Name: Kaylah Nova MD  Signed: 10/6/2021 7:53 PM  Workstation Name: [email or web address]  [facility]      Results for orders placed during the hospital encounter of 02/21/20    Adult Transthoracic Echo Complete W/ Cont if Necessary Per Protocol    Interpretation Summary  · Estimated EF appears to be in the range of 61 - 65%.  · Left ventricular systolic function is normal.  · Left ventricular diastolic dysfunction (grade I) consistent with impaired relaxation.  · No significant structural or functional valvular disease.      I have reviewed the medications:  Scheduled Meds:pantoprazole, 40 mg, Intravenous, Daily  piperacillin-tazobactam, 3.375 g, Intravenous, Q8H  sodium chloride, 10 mL, Intravenous, Q12H      Continuous Infusions:sodium chloride, 125 mL/hr, Last Rate: 125 mL/hr (10/07/21 0538)      PRN Meds:.•  acetaminophen  •  HYDROmorphone **AND** naloxone  •  magnesium sulfate **OR** magnesium sulfate **OR** magnesium sulfate  •  ondansetron  •  Sodium Chloride (PF)  •  sodium chloride  •  sodium chloride    Assessment/Plan   Assessment & Plan     Active Hospital Problems    Diagnosis  POA   • **Acute gallstone pancreatitis [K85.10]  Yes   • Elevated BP without diagnosis of hypertension [R03.0]  Unknown   • Sepsis, unspecified organism (HCC) [A41.9]  Unknown   • Grade I diastolic dysfunction [I51.89]  Unknown      Resolved Hospital Problems   No resolved problems to display.        Brief Hospital Course to date:  Aneudy Mckeon is a 36 y.o. male with hx of GERD, scoliosis, allergies, and recent Echo with grade I diastolic dysfunction who presents to the ER due to RUQ abdominal pain, worse after eating. Has had intermittent pain of less severity over the past several months. In the ER, labs showed elevated lipase 2821, AST  203, , total bilirubin 1.6, WBC 14.17, and procalcitonin 0.45. He later became tachycardic and febrile to 102.2. CT A/P showed a few shotty right lower quadrant lymph nodes of questionable significance. Gallbladder ultrasound showed multiple shadowing gallstones within the gallbladder without wall thickening, pericholecystic fluid, or biliary ductal dilatation. Case discussed with Dr. Mariscal, General Surgery. Admitted for further management.    *All problems are new to me today.    Sepsis, POA (fever, tachycardia, leukocytosis, elevated procal)  Acute gallstone pancreatitis  --NPO, IV fluids.  --Pain/nausea meds as needed.  --Continue Zosyn.  --F/U blood cultures.  --General Surgery/Dr. Mariscal consulted--plan for OR this afternoon for cholecystectomy.    Elevated BP  --Could be secondary to pain, monitor closely. Does not require treatment at this point.     GERD  --Continue PPI.    Diastolic dysfunction  --Echo Feb 2020 showed grade I diastolic dysfunction.      DVT prophylaxis:  Mechanical DVT prophylaxis orders are present.       AM-PAC 6 Clicks Score (PT): 24 (10/06/21 2210)    Disposition: I expect the patient to be discharged TBD    CODE STATUS:   Code Status and Medical Interventions:   Ordered at: 10/06/21 2950     Code Status:    CPR     Medical Interventions (Level of Support Prior to Arrest):    Full       Sammi Cottrell MD  10/07/21

## 2021-10-07 NOTE — ANESTHESIA PROCEDURE NOTES
Airway  Urgency: elective    Date/Time: 10/7/2021 4:17 PM  Airway not difficult    General Information and Staff    Patient location during procedure: OR  CRNA: Omkar Perez CRNA    Indications and Patient Condition  Indications for airway management: airway protection    Preoxygenated: yes  MILS not maintained throughout  Mask difficulty assessment: 1 - vent by mask    Final Airway Details  Final airway type: endotracheal airway      Successful airway: ETT  Cuffed: yes   Successful intubation technique: video laryngoscopy  Facilitating devices/methods: cricoid pressure and Bougie  Endotracheal tube insertion site: oral  Blade: Oneil  Blade size: 3  ETT size (mm): 7.5  Cormack-Lehane Classification: grade IIa - partial view of glottis  Placement verified by: chest auscultation and capnometry   Measured from: lips  ETT/EBT  to lips (cm): 21  Number of attempts at approach: 1  Assessment: lips, teeth, and gum same as pre-op and atraumatic intubation    Additional Comments  Negative epigastric sounds, Breath sound equal bilaterally with symmetric chest rise and fall. Mac 3 grade III. Bougie passed with ease with Oneil x3. Difficulty passing ETT through glottic opening over bougie given angle only

## 2021-10-07 NOTE — ANESTHESIA POSTPROCEDURE EVALUATION
Patient: Aneudy Mckeon    Procedure Summary     Date: 10/07/21 Room / Location:  [facility]  [facility]  [facility]    Anesthesia Start: 1605 Anesthesia Stop: 1729    Procedure: CHOLECYSTECTOMY LAPAROSCOPIC INTRAOPERATIVE CHOLANGIOGRAM (N/A Abdomen) Diagnosis:     Surgeons: Sonny Mariscal MD Provider: Guera Mcginnis MD    Anesthesia Type: general ASA Status: 2          Anesthesia Type: general    Vitals  Vitals Value Taken Time   BP     Temp     Pulse     Resp     SpO2 96 % 10/07/21 1729           Post Anesthesia Care and Evaluation    Patient location during evaluation: PACU  Patient participation: complete - patient participated  Level of consciousness: awake and alert  Pain management: adequate  Airway patency: patent  Anesthetic complications: No anesthetic complications  PONV Status: none  Cardiovascular status: hemodynamically stable and acceptable  Respiratory status: nonlabored ventilation, acceptable and nasal cannula  Hydration status: acceptable

## 2021-10-07 NOTE — PLAN OF CARE
Goal Outcome Evaluation:  Plan of Care Reviewed With: patient        Progress: no change  Outcome Summary: VSS, RA. Blood cultures drawn, antibiotics infusing. Magnesium currently infusing. Patient spiked a temperature of 102, PRN tylenol given. No complaints of pain throughout the night. Currently NPO. Will continue plan of care.

## 2021-10-07 NOTE — CONSULTS
General Surgery Consultation Note    Date of Service: 10/7/2021  Aneudy Mckeon  [id number]  1985      Referring Provider: Sammi Cottrell MD    Location of Consult: Inpatient     Reason for Consultation: Gallstone pancreatitis, concern for cholecystitis       History of Present Illness:  I am seeing, Aneudy Mckeon, in consultation at request of Sammi Cottrell MD regarding gallstone pancreatitis and concern for cholecystitis.  He is a 36-year-old gentleman with history of scoliosis and diastolic heart dysfunction who presented to [facility] with complaints of over 24 hours of abdominal pain.  He reports that for the last 3 to 4 days he had difficulty with bloating and heartburn.  Starting yesterday morning however he began to develop acute chest and epigastric pain which then radiated to his right upper quadrant.  Pain has been constant his right upper quadrant since that time.  He also felt bloated associated with this.  He has not had any nausea or vomiting.  Pain in his right upper quadrant became so severe yesterday that he decided to come to the emergency department.  He reports the pain is a lot better this morning and now only a 2 out of 10.  He had a bowel movement this morning.  He was febrile overnight to 102.  He reports that he has intermittently had episodes of epigastric pain after food intake over the last several months.  This acute pain started yesterday morning but he has felt like he had an upset stomach for several days now.  He has not had any abdominal surgeries before.  He does not take anticoagulants.  He denies any history of liver or pancreatic disease.      Problems Addressed this Visit     None      Visit Diagnoses     Acute pancreatitis, unspecified complication status, unspecified pancreatitis type    -  Primary    Calculus of gallbladder and bile duct without cholecystitis or obstruction        Upper abdominal pain          Diagnoses       Codes  Comments    Acute pancreatitis, unspecified complication status, unspecified pancreatitis type    -  Primary ICD-10-CM: K85.90  ICD-9-CM: 577.0     Calculus of gallbladder and bile duct without cholecystitis or obstruction     ICD-10-CM: K80.70  ICD-9-CM: 574.90     Upper abdominal pain     ICD-10-CM: R10.10  ICD-9-CM: 789.09           PMHx:  Past Medical History:   Diagnosis Date   • Allergy    • GERD (gastroesophageal reflux disease)    • Grade I diastolic dysfunction 10/6/2021   • Scoliosis        Past Surgical History:  Past Surgical History:   • DENTAL PROCEDURE       Allergies:  Allergies   Allergen Reactions   • Benadryl [Diphenhydramine] Rash       Medications:  No current facility-administered medications on file prior to encounter.     Current Outpatient Medications on File Prior to Encounter   Medication Sig Dispense Refill   • Fexofenadine HCl (ALLEGRA ALLERGY PO) Take  by mouth.     • Multiple Vitamin (MULTI-VITAMIN DAILY PO) Take  by mouth.     • omeprazole (priLOSEC) 20 MG capsule Take 20 mg by mouth Daily.     • Probiotic Product (PROBIOTIC-10 PO) Take  by mouth.           Current Facility-Administered Medications:   •  acetaminophen (TYLENOL) tablet 650 mg, 650 mg, Oral, Q6H PRN, Deisy Spring, DO, 650 mg at 10/06/21 2350  •  HYDROmorphone (DILAUDID) injection 0.25 mg, 0.25 mg, Intravenous, Q4H PRN **AND** naloxone (NARCAN) injection 0.4 mg, 0.4 mg, Intravenous, Q5 Min PRN, Deisy Spring, DO  •  Magnesium Sulfate 2 gram Bolus, followed by 8 gram infusion (total Mg dose 10 grams)- Mg less than or equal to 1mg/dL, 2 g, Intravenous, PRN **OR** Magnesium Sulfate 2 gram / 50mL Infusion (GIVE X 3 BAGS TO EQUAL 6GM TOTAL DOSE) - Mg 1.1 - 1.5 mg/dl, 2 g, Intravenous, PRN, Last Rate: 25 mL/hr at 10/07/21 0538, 2 g at 10/07/21 0538 **OR** Magnesium Sulfate 4 gram infusion- Mg 1.6-1.9 mg/dL, 4 g, Intravenous, PRN, Saw, Deisy J, DO  •  ondansetron (ZOFRAN) injection 4 mg, 4 mg, Intravenous, Q30 Min PRN,  "Fadi Morris DO  •  pantoprazole (PROTONIX) injection 40 mg, 40 mg, Intravenous, Daily, Deisy Spring DO, 40 mg at 10/06/21 2351  •  piperacillin-tazobactam (ZOSYN) 3.375 g in iso-osmotic dextrose 50 ml (premix), 3.375 g, Intravenous, Q8H, Deisy Spring DO, 3.375 g at 10/07/21 0538  •  Sodium Chloride (PF) 0.9 % 10 mL, 10 mL, Intravenous, PRN, Sincere Resendiz MD  •  sodium chloride 0.9 % bolus 1,000 mL, 1,000 mL, Intravenous, PRN, Deisy Spring DO  •  sodium chloride 0.9 % flush 10 mL, 10 mL, Intravenous, Q12H, Deisy Spring DO  •  sodium chloride 0.9 % flush 10 mL, 10 mL, Intravenous, PRN, Deisy Spring DO  •  sodium chloride 0.9 % infusion, 125 mL/hr, Intravenous, Continuous, Fadi Morris DO, Last Rate: 125 mL/hr at 10/07/21 0538, 125 mL/hr at 10/07/21 0538      Family History:  Family History   Problem Relation Age of Onset   • Diabetes type II Mother    • Diabetes type II Father    • Colon cancer Paternal Grandmother    • Diabetes type II Paternal Grandfather    • Coronary artery disease Paternal Grandfather    • No Known Problems Brother        Social History: Pt lives in [facility].    Tobacco use: Denies     EtOH use : Denies    Illicit drug use: Denies       Review of Systems:   Constitutional: No fevers, chills or malaise   Eyes: Denies visual changes    Cardiovascular: Denies chest pain, palpitations   Pulmonary: Denies cough or shortness of breath   Abdominal/ GI: See HPI    Genitourinary: Denies dysuria or hematuria   Musculoskeletal: Denies any but chronic joint aches, pains or deformities   Psychiatric: No recent mood changes   Neurologic: No paresthesias or loss of function    /76 (BP Location: Left arm, Patient Position: Lying)   Pulse 85   Temp 99.4 °F (37.4 °C) (Oral)   Resp 18   Ht 177.8 cm (70\")   Wt 89.2 kg (196 lb 9.6 oz)   SpO2 95%   BMI 28.21 kg/m²   Body mass index is 28.21 kg/m².    Gen: Awake, alert, no acute distress, resting in " bed  Head: Normocephalic, atraumatic.   Eyes: Pupils equal, round, react to light and accommodation.   Mouth: Oral mucosa without lesions,   Neck: No masses, lymphadenopathy or carotid bruits bilaterally   CV: Rhythm and rate regular, no murmurs, rubs or gallops  Lungs: Clear to auscultation bilaterally, not labored on room air   Abdomen: Mild tenderness to palpation in the epigastrium and right upper quadrant, nondistended, no scars  Groin : No obvious hernias bilaterally   Extremities:  No cyanosis, clubbing or edema bilaterally  Lymphatics: No abnormal lymphadenopathy appreciated   Neurologic: No gross deficits         CBC  Results from last 7 days   Lab Units 10/06/21  1648   WBC 10*3/mm3 14.17*   HEMOGLOBIN g/dL 15.8   HEMATOCRIT % 47.0   PLATELETS 10*3/mm3 193       CMP  Results from last 7 days   Lab Units 10/06/21  1648   SODIUM mmol/L 141   POTASSIUM mmol/L 4.2   CHLORIDE mmol/L 104   CO2 mmol/L 25.0   BUN mg/dL 15   CREATININE mg/dL 1.01   CALCIUM mg/dL 10.0   BILIRUBIN mg/dL 1.6*   ALK PHOS U/L 96   ALT (SGPT) U/L 203*   AST (SGOT) U/L 190*   GLUCOSE mg/dL 165*       Radiology  Imaging Results (Last 72 Hours)     Procedure Component Value Units Date/Time    US Gallbladder [[id number]] Collected: 10/06/21 1953     Updated: 10/06/21 1956    Narrative:      US Abdomen Ltd    INDICATION:   Right upper quadrant pain for a week. Epigastric pain.    COMPARISON:   CT abdomen pelvis from 6:01 PM.    FINDINGS:  PANCREAS: Obscured by overlying bowel gas. Please refer to the earlier CT scan report.    LIVER: No focal mass appreciated on images provided. No intrahepatic biliary ductal dilatation. Common bile duct is 2.2 mm which is normal. I do not see images to evaluate the inferior vena cava or main portal vein. There appear patent on the earlier CT  scan.    GALLBLADDER: There are multiple shadowing gallstones within the gallbladder. Technologist does not indicate whether or not there is a sonographic Hatfield's sign.  Gallbladder wall is not thickened and there is no evidence for pericholecystic fluid.    RIGHT KIDNEY: The right kidney measures 9.4 x 5.5 x 5.2 cm dimension. No hydronephrosis. cm..          Impression:      Multiple shadowing gallstones within the gallbladder. There is no wall thickening, pericholecystic fluid or biliary ductal dilatation appreciated. Technologist does not indicate whether not there is a sonographic Hatfield sign. Please correlate further  clinically.    Signer Name: Kaylah Nova MD   Signed: 10/6/2021 7:53 PM   Workstation Name: [email or web address]    [facility]    CT Abdomen Pelvis With Contrast [[id number]] Collected: 10/06/21 1852     Updated: 10/06/21 1916    Narrative:      EXAMINATION: CT ABDOMEN/PELVIS W CONTRAST - 10/06/2021     INDICATION: Nausea and vomiting. Right upper quadrant pain.      TECHNIQUE: 5 mm post IV contrast portal venous phase and delayed venous  phase images through the abdomen and pelvis.     The radiation dose reduction device was turned on for each scan per the  ALARA (As Low as Reasonably Achievable) protocol.     COMPARISON: None     FINDINGS: History indicates abdominal pain, nausea and vomiting.     The included lower lungs appear grossly clear. There is mild diffuse  fatty liver change. No significant abnormalities are seen of the  gallbladder, spleen, pancreas, adrenal glands, or kidneys. No upper  abdominal adenopathy, ascites or acute inflammatory change is  appreciated. Regarding the lower abdomen and pelvis, bowel loops are  normal in caliber and normal in appearance. There is a normal-appearing  appendix, terminal ileum and cecum. There are shotty right lower  quadrant lymph nodes, the largest which are up to 7 mm in diameter. No  retroperitoneal or mesenteric adenopathy is seen. Delayed venous phase  images show no evidence of obstructive uropathy.       Impression:      A few shotty right lower quadrant lymph nodes of  questionable  significance. No evidence of acute inflammatory focus or  other clearly acute intra-abdominal or intrapelvic disease is seen.     DICTATED:   10/06/2021  EDITED/ls :   10/06/2021                    Results Review: I have personally reviewed all of the recent lab and imaging results available at this time.     Assessment:  Mr. Mckeon is a 36-year-old gentleman with history of grade 1 diastolic dysfunction scoliosis with gallstone pancreatitis and concern for cholecystitis.  He was febrile overnight to 102.2, but otherwise vital signs are stable.  Labs on presentation demonstrate a leukocytosis of 14,000, elevated lipase 2821 as well as mild elevation of his AST and ALT and bilirubin of 1.6.  I have personally viewed CT scan of the abdomen pelvis which is without acute findings.  I have also reviewed a right upper quadrant ultrasound which demonstrates stones within the gallbladder, but no secondary findings of cholecystitis and no biliary dilatation with a common bile duct of 2.2 mm.  Labs from this morning are still pending.  Given the findings of gallstones and the fact that he is feeling much better this morning, I think most likely explanation is a passed stone.  I had a long discussion with him regarding treatment options, and I recommended to proceed with cholecystectomy with intraoperative cholangiogram today.    Plan:  -Keep n.p.o.  -IV fluids  -IV Zosyn  -Plan to proceed to the operating room today for laparoscopic cholecystectomy with intraoperative cholangiogram    I had a long discussion with the patient regarding the risks, benefits, and alternatives to the procedure including specifically discussing the risks of bleeding, infection, damage to adjacent structures, possible need for further operations, possible need for conversion to an open operation, risks of common bile duct injury and risk of bile leak after the procedure. They wish to proceed.         I discussed the patient's findings and my  recommendations with the patient and/or family, as well as the primary team     Sonny Mariscal MD  10/07/21  07:55 EDT    Labs from this morning reviewed. Bilirubin up at 4.3 and transaminases up somewhat as well at 572/333. He reports that he feels much improved this morning with less pain. His US did not show biliary dilation with a CBD of 2mm. I think it is most likely that he passed a stone based on his clinical history and significant improvement in his pain, and that the labs are lagging behind somewhat. I discussed briefly with GI and will plan to proceed forward with cholecystectomy and IOC today    Electronically signed by Sonny Mariscal MD, 10/07/21, 10:37 AM EDT.

## 2021-10-07 NOTE — CASE MANAGEMENT/SOCIAL WORK
Discharge Planning Assessment  [facility]     Patient Name: Aneudy Mckeon  MRN: [id number]  Today's Date: 10/7/2021    Admit Date: 10/6/2021    Discharge Needs Assessment     Row Name 10/07/21 1132       Living Environment    Lives With  spouse    Name(s) of Who Lives With Patient  Evita Mckeon    Current Living Arrangements  home/apartment/condo    Primary Care Provided by  self    Provides Primary Care For  no one    Family Caregiver if Needed  spouse    Quality of Family Relationships  involved;helpful    Able to Return to Prior Arrangements  yes       Transition Planning    Patient/Family Anticipates Transition to  home with family    Patient/Family Anticipated Services at Transition  none    Transportation Anticipated  family or friend will provide       Discharge Needs Assessment    Readmission Within the Last 30 Days  no previous admission in last 30 days    Equipment Currently Used at Home  none    Concerns to be Addressed  denies needs/concerns at this time    Anticipated Changes Related to Illness  none    Equipment Needed After Discharge  none        Discharge Plan     Row Name 10/07/21 1132       Plan    Plan  home with family    Patient/Family in Agreement with Plan  yes    Plan Comments  Pt lives in [facility] with his wife. He reports he is independent with ADLs and denies use of any DME. Pt is followed by his PCP and reports he has drug coverage. His plan for discharge is to return home. No discharge needs at this time.    Final Discharge Disposition Code  01 - home or self-care        Continued Care and Services - Admitted Since 10/6/2021    Coordination has not been started for this encounter.         Demographic Summary     Row Name 10/07/21 1131       General Information    Admission Type  inpatient    Referral Source  physician    Reason for Consult  discharge planning    General Information Comments  PCP Roberto Perdue       Contact Information    Permission Granted to Share Info With   family/designee    Contact Information Comments  Evita Mckeon [phone]        Functional Status     Row Name 10/07/21 1131       Functional Status, IADL    Medications  independent    Meal Preparation  independent    Housekeeping  independent    Laundry  independent    Shopping  independent       Mental Status    General Appearance WDL  WDL       Mental Status Summary    Recent Changes in Mental Status/Cognitive Functioning  no changes        Psychosocial    No documentation.       Abuse/Neglect    No documentation.       Legal    No documentation.       Substance Abuse    No documentation.       Patient Forms    No documentation.           Colleen Pratt RN

## 2021-10-07 NOTE — OP NOTE
Operative Report    Patient Name:  Aneudy Mckeon  YOB: 1985  [id number]  10/7/2021      PREOPERATIVE DIAGNOSIS: Acute cholecystitis, gallstone pancreatitis, concern for choledocholithiasis      POSTOPERATIVE DIAGNOSIS: Same        PROCEDURE PERFORMED:     1. Laparoscopic cholecystectomy with intra-operative cholangiography        SURGEON: Sonny Mariscal MD      ASSISTANT: None        SPECIMENS: Gallbladder and contents        ANESTHESIA: General.        FINDINGS:     1. Critical view of safety established prior to ligation of cystic structures     2. Intra-operative cholangiogram demonstrated excellent filling of the cystic, common, right and left hepatic ducts as well as flow of contrast into the duodenum without retained stone or filling defect        INDICATIONS:      The patient is a 36 y.o. male with a history of abdominal pain and a clinical diagnosis consistent with gallstone pancreatitis. Pre-operative imaging including U/S scan confirmed the diagnosis. The risks, benefits and alternatives of Laparoscopic cholecystectomy with cholangiography were discussed with the patient and their family preoperatively and they agreed to proceed.        DESCRIPTION OF PROCEDURE:     The patient was taken to the operating room and positioned supine on the operating room table. General anesthesia was initiated. The patient was prepped and draped in the usual sterile fashion. Antibiotics were given preoperatively. SCDs were properly placed on the patient and turned on.     Local Anesthetic was injected prior to all skin incisions.  An infraumbilical incision was made using an 11 blade scalpel.  Dissection was carried down bluntly to the level of the anterior abdominal wall fascia.  The base of the umbilicus was grasped and elevated.  Under direct visualization, the anterior abdominal wall fascia was incised sharply.  Stay sutures of 0 Vicryl were placed on either side of this.  The peritoneal cavity  was then bluntly entered.  A 12 mm Chinchilla trocar was advanced and pneumoperitoneum was established to 15 mmHg.  The abdomen was surveyed with special attention to the viscera underlying the insertion site which was found to be free of injury. Next, under direct laparoscopic visualization three additional 5 mm trocars were placed in the right upper quadrant.  The patient was then positioned in the head-up position with the table tilted to the left.     There were some omental adhesions to the dome of the gallbladder.  These were taken down using hook cautery.  The gallbladder was noted to be mildly distended.  There was some edema of the gallbladder wall consistent with cholecystitis.  The fundus of the gallbladder was retracted cephalad while the infundibulum of the gallbladder was retracted laterally. Using a combination of hook cautery as well as a [place] dissector, the peritoneum surrounding the cystic pedicle was stripped. Cystic structures were dissected. A critical view of safety was established, meanin and only 2 structures were visualized entering the gallbladder, all fibrous and fatty tissue between the cystic artery and cystic duct were cleared, and the posterior 1/3 of the gallbladder was removed from the cystic plate.      The cystic duct was then clipped at its junction with the infundibulum of the gallbladder, and transected across 50% of its circumference. A cholangiogram catheter was then placed within the duct, and an on-table cholangiography under fluoroscopy was obtained. There was excellent filling of the cystic, common, right and left hepatic ducts as well as good flow of contrast into the duodenum without retained stone or filling defect . The cholangiogram catheter was then removed, and the cystic duct was ligated using 2 clips placed on the distal cystic duct, 1 clip placed on the proximal cystic duct, and the cystic duct was then completely transected with laparoscopic scissors. Next 2  clips were placed on the proximal cystic artery, 1 clip was placed on the distal cystic artery and this was transected with laparoscopic scissors.  Next the gallbladder was removed from the cystic plate using hook electrocautery.  The gallbladder was noted to be somewhat intrahepatic.     A 5 mm 30 degree laparoscope was then inserted through the subxiphoid trocar site to visualize the placement of the gallbladder within an Endo Catch bag and then extraction of the gallbladder through the periumbilical trocar site utilizing the Endo Catch bag. Instruments were returned to their native positions. Hemostasis of the cystic plate was confirmed using electrocautery. The clipped cystic structures were carefully examined and there was no sign of bilious or bloody drainage. The table was then leveled and limited irrigation of the right upper quadrant was performed with normal saline and hemostasis again confirmed. Next, the 5 mm trocars were removed under direct laparoscopic visualization. The 12 mm trocar was then withdrawn and pneumoperitoneum was released.     The fascia of the periumbilical trocar site was then closed in a figure-of-eight fashion with an 0 Vicryl suture. All wound sites were irrigated and hemostasis confirmed. The skin incisions were then closed using a 4-0 Monocryl suture in the subcuticular layer. Dermal glue was applied to each of the incision sites.     After the procedure, the patient was awakened, extubated, and taken to the postoperative anesthesia care unit for recovery. All needle, instrument, and lap counts were correct. I was personally present and performed all portions of the procedure. There were no immediate complications         Sonny Mariscal MD  10/7/2021  17:18 EDT

## 2021-10-07 NOTE — PROGRESS NOTES
"Patient Name:  Aneudy Mckeon  YOB: 1985  [id number]    Surgery Post - Operative Note    Date of visit: 10/7/2021      Subjective: Waking up in the PACU.  Pain seems to be controlled.  Heart rate in the 70s, systolics in the 130s        Objective:    /74   Pulse 85   Temp 97.7 °F (36.5 °C) (Temporal)   Resp 18   Ht 177.8 cm (70\")   Wt 88.9 kg (196 lb)   SpO2 95%   BMI 28.12 kg/m²     CV:  Regular rate and rhythm   L:  Clear to auscultation bilaterally. Not labored on O2 by NC   ABD:  Soft, appropriately tender. Dressings  clean, dry and intact   EXT:  No cyanosis, clubbing or edema        Assessment/ Plan:     Recovering well after laparoscopic cholecystectomy with intraoperative cholangiogram. Continue Pulmonary toilet        Sonny Mariscal MD  10/7/2021  18:19 EDT      "

## 2021-10-07 NOTE — PAYOR COMM NOTE
"Aneudy Mckeon (36 y.o. Male)     Date of Birth Social Security Number Address Home Phone MRN    1985  [address] [place] [address] [phone] [id number]    M[employer] Marital Status          None        Admission Date Admission Type Admitting Provider Attending Provider Department, Room/Bed    10/6/21 Emergency Deisy Spring DO Reddy, Mayuri V, MD [facility] 2F, S207/    Discharge Date Discharge Disposition Discharge Destination                       Attending Provider: Sammi Cottrell MD    Allergies: Benadryl [Diphenhydramine]    Isolation: None   Infection: None   Code Status: CPR    Ht: 177.8 cm (70\")   Wt: 89.2 kg (196 lb 9.6 oz)    Admission Cmt: None   Principal Problem: Acute gallstone pancreatitis [K85.10]                 Active Insurance as of 10/6/2021     Primary Coverage     Payor Plan Insurance Group Employer/Plan Group    ANTHEM MEDICAID ANTHEM MEDICAID [id number]     Payor Plan Address Payor Plan Phone Number Payor Plan Fax Number Effective Dates    [address] [address] [phone]  2021 - None Entered    [facility] [id number]       Subscriber Name Subscriber Birth Date Member ID       ANEUDY MCKEON 1985 [id number]                 Emergency Contacts      (Rel.) Home Phone Work Phone Mobile Phone    JASMEET MCKEON (Spouse) [phone] -- --    Tyler Mckeon (Brother) [phone] -- --    DAMION MCKEON (Mother) [phone] -- --            Insurance Information                ANTHEM MEDICAID/ANTHEM [employer]ICAID Phone: [phone]    Subscriber: Aneudy Mckeon Subscriber#: [id number]    Group#: [id number] Precert#:              History & Physical      Deisy Spring DO at 10/06/21 2237              [facility] Medicine Services  HISTORY AND PHYSICAL    Patient Name: Aneudy Mckeon  : 1985  MRN: [id number]  Primary Care Physician: Provider, No Known  Date of admission: " "10/6/2021      Subjective   Subjective     Chief Complaint:  ABD PAIN     HPI:  Aneudy Mckeon is a 36 y.o. male with past medical history of scoliosis, GERD, allergies and recent echo noting Grade I diastolic dysfunction.  Patient presented to [facility] ED with worsening right upper quadrant pain.  Patient states he had similar pain at a lesser severity that occurs every few months. In the past the pain would resolve quickly.  Pt reports that he had eaten a grilled chicken biscuit and and hash browns.  When he noted severe pain in ruq and under right ribs.  He states that the pain was a throbbing and sharp pain. Lasted 3-4 hours. No relief with heating pad. Therefore due to the severity of pain patient decided to come to the ED.  Upon arrival patient was noted to have a lipase of 2,821.  Bilirubin of 1.6, AST of 203, ALT of 190 and WBC of 14.17. CT of abd and pelvis was noncontributory except for a few shotty lymph nodes in the right upper quadrant.  Gallbladder ultrasound noted \"Multiple shadowing gallstones within the gallbladder. There is no wall thickening, pericholecystic fluid or biliary ductal dilatation appreciated\". Due to patient's symptoms and fever with elevated WBC count decision was made to admit patient to hospitalist service for further evaluation management.      COVID Details:    Symptoms:    [] NONE [x] Fever []  Cough [] Shortness of breath [] Change in taste/smell      The patient qualifies to receive the vaccine, but they have not yet received it.      Review of Systems   Constitutional: Positive for chills and fever. Negative for fatigue.        Rigor    HENT: Negative for congestion, ear pain, rhinorrhea and sore throat.    Respiratory: Negative for cough, shortness of breath and wheezing.    Cardiovascular: Negative for chest pain, palpitations and leg swelling.   Gastrointestinal: Positive for abdominal distention and abdominal pain. Negative for blood in stool, constipation, diarrhea, " nausea and vomiting.        Pain seemed to increase when strained to have a BM.    Genitourinary: Positive for flank pain (radiates into right flank ). Negative for dysuria and hematuria.   Musculoskeletal: Negative for back pain.   Skin: Negative.    Neurological: Positive for tremors, light-headedness and headaches (with fever ). Negative for dizziness.   Psychiatric/Behavioral: Negative for dysphoric mood and sleep disturbance. The patient is not nervous/anxious.         All other systems reviewed and are negative.     Personal History     Past Medical History:   Diagnosis Date   • Allergy    • GERD (gastroesophageal reflux disease)    • Grade I diastolic dysfunction 10/6/2021   • Scoliosis        Past Surgical History:   Procedure Laterality Date   • DENTAL PROCEDURE         Family History:  family history includes Colon cancer in his paternal grandmother; Coronary artery disease in his paternal grandfather; Diabetes type II in his father, mother, and paternal grandfather; No Known Problems in his brother. Otherwise pertinent FHx was reviewed and unremarkable.     Social History:  reports that he has never smoked. He has never used smokeless tobacco. He reports that he does not drink alcohol and does not use drugs.  Social History     Social History Narrative    caffeine use: tea occassionally       Medications:  Available home medication information reviewed.  Medications Prior to Admission   Medication Sig Dispense Refill Last Dose   • Fexofenadine HCl (ALLEGRA ALLERGY PO) Take  by mouth.   10/6/2021 at Unknown time   • Multiple Vitamin (MULTI-VITAMIN DAILY PO) Take  by mouth.   10/6/2021 at Unknown time   • omeprazole (priLOSEC) 20 MG capsule Take 20 mg by mouth Daily.   10/6/2021 at Unknown time   • Probiotic Product (PROBIOTIC-10 PO) Take  by mouth.   10/6/2021 at Unknown time       Allergies   Allergen Reactions   • Benadryl [Diphenhydramine] Rash       Objective   Objective     Vital Signs:   Temp:  [97.8  °F (36.6 °C)-102.2 °F (39 °C)] 102.2 °F (39 °C)  Heart Rate:  [] 102  Resp:  [16-18] 18  BP: (131-151)/(76-89) 145/78       Physical Exam  Vitals and nursing note reviewed.   Constitutional:       General: He is not in acute distress.     Appearance: He is ill-appearing. He is not diaphoretic.      Comments: Flushed    HENT:      Head: Atraumatic.      Right Ear: External ear normal.      Left Ear: External ear normal.   Eyes:      General:         Right eye: No discharge.         Left eye: No discharge.      Conjunctiva/sclera: Conjunctivae normal.   Cardiovascular:      Rate and Rhythm: Normal rate and regular rhythm.      Heart sounds: No murmur heard.     Pulmonary:      Effort: Pulmonary effort is normal.      Breath sounds: Normal breath sounds. No wheezing or rales.   Abdominal:      General: Bowel sounds are normal. There is no distension.      Palpations: Abdomen is soft.      Tenderness: There is abdominal tenderness in the right upper quadrant.   Musculoskeletal:      Right lower leg: No edema.      Left lower leg: No edema.   Skin:     Findings: No rash.   Neurological:      Mental Status: He is alert and oriented to person, place, and time.            Result Review:  I have personally reviewed the results from the time of this admission to 10/6/2021 22:37 EDT and agree with these findings:  [x]  Laboratory  []  Microbiology  [x]  Radiology  []  EKG/Telemetry   []  Cardiology/Vascular   []  Pathology  []  Old records  []  Other:  Most notable findings include:       LAB RESULTS:      Lab 10/06/21  1648   WBC 14.17*   HEMOGLOBIN 15.8   HEMATOCRIT 47.0   PLATELETS 193   NEUTROS ABS 12.46*   IMMATURE GRANS (ABS) 0.10*   LYMPHS ABS 0.61*   MONOS ABS 0.94*   EOS ABS 0.02   MCV 92.9         Lab 10/06/21  1648   SODIUM 141   POTASSIUM 4.2   CHLORIDE 104   CO2 25.0   ANION GAP 12.0   BUN 15   CREATININE 1.01   GLUCOSE 165*   CALCIUM 10.0         Lab 10/06/21  1648   TOTAL PROTEIN 7.7   ALBUMIN 4.80    GLOBULIN 2.9   ALT (SGPT) 203*   AST (SGOT) 190*   BILIRUBIN 1.6*   ALK PHOS 96   LIPASE 2,821*                     UA    Urinalysis 10/6/21   Specific Buhl, UA 1.015   Ketones, UA Negative   Blood, UA Negative   Leukocytes, UA Negative   Nitrite, UA Negative             Microbiology Results (last 10 days)     ** No results found for the last 240 hours. **          CT Abdomen Pelvis With Contrast    Result Date: 10/6/2021  EXAMINATION: CT ABDOMEN/PELVIS W CONTRAST - 10/06/2021  INDICATION: Nausea and vomiting. Right upper quadrant pain.  TECHNIQUE: 5 mm post IV contrast portal venous phase and delayed venous phase images through the abdomen and pelvis.  The radiation dose reduction device was turned on for each scan per the ALARA (As Low as Reasonably Achievable) protocol.  COMPARISON: None  FINDINGS: History indicates abdominal pain, nausea and vomiting.  The included lower lungs appear grossly clear. There is mild diffuse fatty liver change. No significant abnormalities are seen of the gallbladder, spleen, pancreas, adrenal glands, or kidneys. No upper abdominal adenopathy, ascites or acute inflammatory change is appreciated. Regarding the lower abdomen and pelvis, bowel loops are normal in caliber and normal in appearance. There is a normal-appearing appendix, terminal ileum and cecum. There are shotty right lower quadrant lymph nodes, the largest which are up to 7 mm in diameter. No retroperitoneal or mesenteric adenopathy is seen. Delayed venous phase images show no evidence of obstructive uropathy.      Impression: A few shotty right lower quadrant lymph nodes of questionable significance. No evidence of acute inflammatory focus or other clearly acute intra-abdominal or intrapelvic disease is seen.  DICTATED:   10/06/2021 EDITED/ls :   10/06/2021       US Gallbladder    Result Date: 10/6/2021  US Abdomen Ltd INDICATION: Right upper quadrant pain for a week. Epigastric pain. COMPARISON: CT abdomen pelvis  from 6:01 PM. FINDINGS: PANCREAS: Obscured by overlying bowel gas. Please refer to the earlier CT scan report. LIVER: No focal mass appreciated on images provided. No intrahepatic biliary ductal dilatation. Common bile duct is 2.2 mm which is normal. I do not see images to evaluate the inferior vena cava or main portal vein. There appear patent on the earlier CT scan. GALLBLADDER: There are multiple shadowing gallstones within the gallbladder. Technologist does not indicate whether or not there is a sonographic Hatfield's sign. Gallbladder wall is not thickened and there is no evidence for pericholecystic fluid. RIGHT KIDNEY: The right kidney measures 9.4 x 5.5 x 5.2 cm dimension. No hydronephrosis. cm..     Impression: Multiple shadowing gallstones within the gallbladder. There is no wall thickening, pericholecystic fluid or biliary ductal dilatation appreciated. Technologist does not indicate whether not there is a sonographic Hatfield sign. Please correlate further clinically. Signer Name: Kaylah Nova MD  Signed: 10/6/2021 7:53 PM  Workstation Name: [email or web address]  [facility]      Results for orders placed during the hospital encounter of 02/21/20    Adult Transthoracic Echo Complete W/ Cont if Necessary Per Protocol    Interpretation Summary  · Estimated EF appears to be in the range of 61 - 65%.  · Left ventricular systolic function is normal.  · Left ventricular diastolic dysfunction (grade I) consistent with impaired relaxation.  · No significant structural or functional valvular disease.      Assessment/Plan   Assessment & Plan     Active Hospital Problems    Diagnosis  POA   • **Sepsis, unspecified organism (HCC) [A41.9]  Unknown   • Acute gallstone pancreatitis [K85.10]  Yes     Priority: High   • Elevated BP without diagnosis of hypertension [R03.0]  Unknown   • Tachycardia [R00.0]  Unknown   • Fever [R50.9]  Unknown   • Grade I diastolic dysfunction [I51.89]  Unknown     PLAN:  -Admit  patient to telemetry  -Hydrate aggressively with IV fluids, obtain blood cultures x2, check lactic and procalcitonin, will start IV Zosyn  -Consult Dr. Mariscal in the a.m.  -We will monitor fingerstick blood sugars and cover with sliding scale insulin as indicated.  -Check A1c and lipid panel in the a.m.  -Keep patient n.p.o. tonight  -Dilaudid for pain control  --Covid swab pending.   --Increased heart rate with history of grade 1 diastolic dysfunction on previous echocardiogram.  We will obtain EKG. And monitor on Tele      DVT prophylaxis:  scds      CODE STATUS:    Code Status and Medical Interventions:   Ordered at: 10/06/21 3881     Code Status:    CPR     Medical Interventions (Level of Support Prior to Arrest):    Full       Admission Status:  I believe this patient meets INPATIENT status due to acute gallstone pancreatitis .  I feel patient’s risk for adverse outcomes and need for care warrant INPATIENT evaluation and I predict the patient’s care encounter to likely last beyond 2 midnights.      Deisy Spring DO  10/06/21      Electronically signed by Deisy Spring DO at 10/07/21 0258

## 2021-10-08 ENCOUNTER — READMISSION MANAGEMENT (OUTPATIENT)
Dept: CALL CENTER | Facility: HOSPITAL | Age: 36
End: 2021-10-08

## 2021-10-08 VITALS
RESPIRATION RATE: 18 BRPM | HEIGHT: 70 IN | OXYGEN SATURATION: 95 % | WEIGHT: 196 LBS | BODY MASS INDEX: 28.06 KG/M2 | HEART RATE: 71 BPM | DIASTOLIC BLOOD PRESSURE: 68 MMHG | SYSTOLIC BLOOD PRESSURE: 121 MMHG | TEMPERATURE: 98.7 F

## 2021-10-08 LAB
ALBUMIN SERPL-MCNC: 3.7 G/DL (ref 3.5–5.2)
ALBUMIN/GLOB SERPL: 1.5 G/DL
ALP SERPL-CCNC: 90 U/L (ref 39–117)
ALT SERPL W P-5'-P-CCNC: 414 U/L (ref 1–41)
ANION GAP SERPL CALCULATED.3IONS-SCNC: 9 MMOL/L (ref 5–15)
AST SERPL-CCNC: 153 U/L (ref 1–40)
BASOPHILS # BLD AUTO: 0.02 10*3/MM3 (ref 0–0.2)
BASOPHILS NFR BLD AUTO: 0.2 % (ref 0–1.5)
BILIRUB SERPL-MCNC: 1.9 MG/DL (ref 0–1.2)
BUN SERPL-MCNC: 10 MG/DL (ref 6–20)
BUN/CREAT SERPL: 10.2 (ref 7–25)
CALCIUM SPEC-SCNC: 8.2 MG/DL (ref 8.6–10.5)
CHLORIDE SERPL-SCNC: 105 MMOL/L (ref 98–107)
CO2 SERPL-SCNC: 23 MMOL/L (ref 22–29)
CREAT SERPL-MCNC: 0.98 MG/DL (ref 0.76–1.27)
DEPRECATED RDW RBC AUTO: 41 FL (ref 37–54)
EOSINOPHIL # BLD AUTO: 0.03 10*3/MM3 (ref 0–0.4)
EOSINOPHIL NFR BLD AUTO: 0.3 % (ref 0.3–6.2)
ERYTHROCYTE [DISTWIDTH] IN BLOOD BY AUTOMATED COUNT: 12.4 % (ref 12.3–15.4)
GFR SERPL CREATININE-BSD FRML MDRD: 88 ML/MIN/1.73
GLOBULIN UR ELPH-MCNC: 2.5 GM/DL
GLUCOSE SERPL-MCNC: 152 MG/DL (ref 65–99)
HCT VFR BLD AUTO: 38.3 % (ref 37.5–51)
HGB BLD-MCNC: 13.1 G/DL (ref 13–17.7)
IMM GRANULOCYTES # BLD AUTO: 0.04 10*3/MM3 (ref 0–0.05)
IMM GRANULOCYTES NFR BLD AUTO: 0.4 % (ref 0–0.5)
LYMPHOCYTES # BLD AUTO: 0.43 10*3/MM3 (ref 0.7–3.1)
LYMPHOCYTES NFR BLD AUTO: 4.1 % (ref 19.6–45.3)
MAGNESIUM SERPL-MCNC: 2.2 MG/DL (ref 1.6–2.6)
MCH RBC QN AUTO: 31.2 PG (ref 26.6–33)
MCHC RBC AUTO-ENTMCNC: 34.2 G/DL (ref 31.5–35.7)
MCV RBC AUTO: 91.2 FL (ref 79–97)
MONOCYTES # BLD AUTO: 0.95 10*3/MM3 (ref 0.1–0.9)
MONOCYTES NFR BLD AUTO: 9 % (ref 5–12)
NEUTROPHILS NFR BLD AUTO: 86 % (ref 42.7–76)
NEUTROPHILS NFR BLD AUTO: 9.05 10*3/MM3 (ref 1.7–7)
NRBC BLD AUTO-RTO: 0 /100 WBC (ref 0–0.2)
PLATELET # BLD AUTO: 159 10*3/MM3 (ref 140–450)
PMV BLD AUTO: 10.6 FL (ref 6–12)
POTASSIUM SERPL-SCNC: 4.4 MMOL/L (ref 3.5–5.2)
PROT SERPL-MCNC: 6.2 G/DL (ref 6–8.5)
RBC # BLD AUTO: 4.2 10*6/MM3 (ref 4.14–5.8)
SODIUM SERPL-SCNC: 137 MMOL/L (ref 136–145)
WBC # BLD AUTO: 10.52 10*3/MM3 (ref 3.4–10.8)

## 2021-10-08 PROCEDURE — 80053 COMPREHEN METABOLIC PANEL: CPT | Performed by: SURGERY

## 2021-10-08 PROCEDURE — 25010000002 HEPARIN (PORCINE) PER 1000 UNITS: Performed by: SURGERY

## 2021-10-08 PROCEDURE — 99238 HOSP IP/OBS DSCHRG MGMT 30/<: CPT | Performed by: INTERNAL MEDICINE

## 2021-10-08 PROCEDURE — 85025 COMPLETE CBC W/AUTO DIFF WBC: CPT | Performed by: SURGERY

## 2021-10-08 PROCEDURE — 94799 UNLISTED PULMONARY SVC/PX: CPT

## 2021-10-08 PROCEDURE — 83735 ASSAY OF MAGNESIUM: CPT | Performed by: SURGERY

## 2021-10-08 PROCEDURE — 25010000002 PIPERACILLIN SOD-TAZOBACTAM PER 1 G: Performed by: SURGERY

## 2021-10-08 RX ORDER — PANTOPRAZOLE SODIUM 40 MG/1
40 TABLET, DELAYED RELEASE ORAL
Status: DISCONTINUED | OUTPATIENT
Start: 2021-10-08 | End: 2021-10-08 | Stop reason: HOSPADM

## 2021-10-08 RX ORDER — ONDANSETRON 4 MG/1
4 TABLET, FILM COATED ORAL EVERY 6 HOURS PRN
Qty: 14 TABLET | Refills: 0 | Status: SHIPPED | OUTPATIENT
Start: 2021-10-08 | End: 2021-10-14

## 2021-10-08 RX ORDER — OXYCODONE HYDROCHLORIDE AND ACETAMINOPHEN 5; 325 MG/1; MG/1
1 TABLET ORAL EVERY 4 HOURS PRN
Qty: 12 TABLET | Refills: 0 | Status: SHIPPED | OUTPATIENT
Start: 2021-10-08 | End: 2021-10-14

## 2021-10-08 RX ORDER — AMOXICILLIN AND CLAVULANATE POTASSIUM 875; 125 MG/1; MG/1
1 TABLET, FILM COATED ORAL 2 TIMES DAILY
Qty: 8 TABLET | Refills: 0 | Status: SHIPPED | OUTPATIENT
Start: 2021-10-08 | End: 2021-10-14

## 2021-10-08 RX ORDER — DOCUSATE SODIUM 100 MG/1
100 CAPSULE, LIQUID FILLED ORAL 2 TIMES DAILY
Qty: 30 CAPSULE | Refills: 1 | Status: SHIPPED | OUTPATIENT
Start: 2021-10-08 | End: 2022-10-08

## 2021-10-08 RX ADMIN — PANTOPRAZOLE SODIUM 40 MG: 40 TABLET, DELAYED RELEASE ORAL at 09:29

## 2021-10-08 RX ADMIN — HEPARIN SODIUM 5000 UNITS: 5000 INJECTION, SOLUTION INTRAVENOUS; SUBCUTANEOUS at 05:02

## 2021-10-08 RX ADMIN — TAZOBACTAM SODIUM AND PIPERACILLIN SODIUM 3.38 G: 375; 3 INJECTION, SOLUTION INTRAVENOUS at 05:02

## 2021-10-08 NOTE — DISCHARGE SUMMARY
[facility] Medicine Services  DISCHARGE SUMMARY    Patient Name: Aneudy Mckeon  : 1985  MRN: [id number]    Date of Admission: 10/6/2021  5:02 PM  Date of Discharge:  10/8/2021  Primary Care Physician: Provider, No Known    Consults     Date and Time Order Name Status Description    10/6/2021 11:12 PM Inpatient General Surgery Consult Completed           Hospital Course     Presenting Problem:   Acute gallstone pancreatitis [K85.10]  Acute pancreatitis, unspecified complication status, unspecified pancreatitis type [K85.90]    Active Hospital Problems    Diagnosis  POA   • **Acute gallstone pancreatitis [K85.10]  Yes   • Elevated BP without diagnosis of hypertension [R03.0]  Unknown   • Acute pancreatitis [K85.90]  Yes   • Sepsis, unspecified organism (HCC) [A41.9]  Unknown   • Grade I diastolic dysfunction [I51.89]  Unknown      Resolved Hospital Problems   No resolved problems to display.          Hospital Course:  Aneudy Mckeon is a 36 y.o. male with hx of GERD, scoliosis, allergies, and recent Echo with grade I diastolic dysfunction who presents to the ER due to RUQ abdominal pain, worse after eating. Has had intermittent pain of less severity over the past several months. In the ER, labs showed elevated lipase 2821, , , total bilirubin 1.6, WBC 14.17, and procalcitonin 0.45. He later became tachycardic and febrile to 102.2. CT A/P showed a few shotty right lower quadrant lymph nodes of questionable significance. Gallbladder ultrasound showed multiple shadowing gallstones within the gallbladder without wall thickening, pericholecystic fluid, or biliary ductal dilatation. Case discussed with Dr. Rojas, General Surgery. Admitted for further management. Ultimately patient underwent lap CCY with intraop cholangiogram 10/7 with Dr rojas. Patient doing well and labs trending down. Patient was cleared by discharge by surgery and ambulating in room with good  pain control/return of bowel function. Patient discharged with pain medication and augmentin for 5 remaining days per surgery. Lifting instructions and f/u as below with Dr Mariscal      Discharge Follow Up Recommendations for outpatient labs/diagnostics:  Dr Mariscal in 2 weeks  No lifting more than 10 pounds for the next 2 weeks.  Okay to shower starting today, let warm soapy water run over the wounds.  Do not tub bath for 2 weeks.  Remove dressings tomorrow, leave Steri-Strips in place.    Day of Discharge     HPI:   Feels good. Ambulating in room. Wants to go home    Review of Systems  Gen- No fevers, chills  CV- No chest pain, palpitations  Resp- No cough, dyspnea  GI- No N/V/D, abd pain        Vital Signs:   Temp:  [97.7 °F (36.5 °C)-100.2 °F (37.9 °C)] 98.7 °F (37.1 °C)  Heart Rate:  [] 71  Resp:  [17-18] 18  BP: (121-148)/(68-84) 121/68     Physical Exam:  GEN: NAD, resting in room, awake, seen ambulating in room  HEENT: on room air, atraumatic, normocephalic  NECK: supple, no masses  RESP: on room air, normal effort  CV: on tele, sinus rhythm  PSYCH: normal affect, appropriate  NEURO: awake, alert, no focal deficits noted  MSK: no edema noted  SKIN: no rashes noted       Pertinent  and/or Most Recent Results     LAB RESULTS:      Lab 10/08/21  0536 10/07/21  0823 10/07/21  0024 10/06/21  1648   WBC 10.52 7.55  --  14.17*   HEMOGLOBIN 13.1 14.4  --  15.8   HEMATOCRIT 38.3 40.9  --  47.0   PLATELETS 159 160  --  193   NEUTROS ABS 9.05* 6.14  --  12.46*   IMMATURE GRANS (ABS) 0.04 0.03  --  0.10*   LYMPHS ABS 0.43* 0.40*  --  0.61*   MONOS ABS 0.95* 0.95*  --  0.94*   EOS ABS 0.03 0.01  --  0.02   MCV 91.2 89.7  --  92.9   PROCALCITONIN  --   --  0.45*  --    LACTATE  --   --  0.9  --    PROTIME  --   --  14.0  --    APTT  --   --  31.8  --          Lab 10/08/21  0536 10/07/21  0823 10/07/21  0024 10/06/21  1648   SODIUM 137 140  --  141   POTASSIUM 4.4 3.9  --  4.2   CHLORIDE 105 105  --  104   CO2 23.0  22.0  --  25.0   ANION GAP 9.0 13.0  --  12.0   BUN 10 9  --  15   CREATININE 0.98 1.06  --  1.01   GLUCOSE 152* 114*  --  165*   CALCIUM 8.2* 8.9  --  10.0   MAGNESIUM 2.2  --  1.5*  --    HEMOGLOBIN A1C  --  4.90  --   --          Lab 10/08/21  0536 10/07/21  0823 10/06/21  1648   TOTAL PROTEIN 6.2 6.7 7.7   ALBUMIN 3.70 4.20 4.80   GLOBULIN 2.5 2.5 2.9   ALT (SGPT) 414* 572* 203*   AST (SGOT) 153* 333* 190*   BILIRUBIN 1.9* 4.3* 1.6*   ALK PHOS 90 100 96   LIPASE  --  71* 2,821*         Lab 10/07/21  0024   PROTIME 14.0   INR 1.11         Lab 10/07/21  0823   CHOLESTEROL 146   LDL CHOL 78   HDL CHOL 51   TRIGLYCERIDES 89             Brief Urine Lab Results  (Last result in the past 365 days)      Color   Clarity   Blood   Leuk Est   Nitrite   Protein   CREAT   Urine HCG        10/06/21 1648 Yellow Clear Negative Negative Negative Negative             Microbiology Results (last 10 days)     Procedure Component Value - Date/Time    COVID PRE-OP / PRE-PROCEDURE SCREENING ORDER (NO ISOLATION) - Swab, Nasopharynx [[id number]]  (Normal) Collected: 10/07/21 0325    Lab Status: Final result Specimen: Swab from Nasopharynx Updated: 10/07/21 0409    Narrative:      The following orders were created for panel order COVID PRE-OP / PRE-PROCEDURE SCREENING ORDER (NO ISOLATION) - Swab, Nasopharynx.  Procedure                               Abnormality         Status                     ---------                               -----------         ------                     COVID-19, ABBOTT IN-HOUS...[[id number]]  Normal              Final result                 Please view results for these tests on the individual orders.    COVID-19, ABBOTT IN-HOUSE,NASAL Swab (NO TRANSPORT MEDIA) 2 HR TAT - Swab, Nasopharynx [[id number]]  (Normal) Collected: 10/07/21 0325    Lab Status: Final result Specimen: Swab from Nasopharynx Updated: 10/07/21 0409     COVID19 Presumptive Negative    Narrative:      Fact sheet for providers:  https://www.fda.gov/media/652907/download     Fact sheet for patients: https://www.fda.gov/media/829329/download    Test performed by PCR.  If inconsistent with clinical signs and symptoms patient should be tested with different authorized molecular test.    Blood Culture - Blood, Arm, Left [[id number]] Collected: 10/07/21 0024    Lab Status: Preliminary result Specimen: Blood from Arm, Left Updated: 10/08/21 0100     Blood Culture No growth at 24 hours    Blood Culture - Blood, Hand, Left [[id number]] Collected: 10/07/21 0024    Lab Status: Preliminary result Specimen: Blood from Hand, Left Updated: 10/08/21 0100     Blood Culture No growth at 24 hours    MRSA Screen, PCR (Inpatient) - Swab, Nares [[id number]]  (Normal) Collected: 10/06/21 2357    Lab Status: Final result Specimen: Swab from Nares Updated: 10/07/21 0918     MRSA PCR Negative    Narrative:      MRSA Negative          CT Abdomen Pelvis With Contrast    Result Date: 10/7/2021  EXAMINATION: CT ABDOMEN/PELVIS W CONTRAST - 10/06/2021  INDICATION: Nausea and vomiting. Right upper quadrant pain.  TECHNIQUE: 5 mm post IV contrast portal venous phase and delayed venous phase images through the abdomen and pelvis.  The radiation dose reduction device was turned on for each scan per the ALARA (As Low as Reasonably Achievable) protocol.  COMPARISON: None  FINDINGS: History indicates abdominal pain, nausea and vomiting.  The included lower lungs appear grossly clear. There is mild diffuse fatty liver change. No significant abnormalities are seen of the gallbladder, spleen, pancreas, adrenal glands, or kidneys. No upper abdominal adenopathy, ascites or acute inflammatory change is appreciated. Regarding the lower abdomen and pelvis, bowel loops are normal in caliber and normal in appearance. There is a normal-appearing appendix, terminal ileum and cecum. There are shotty right lower quadrant lymph nodes, the largest which are up to 7 mm in diameter. No  retroperitoneal or mesenteric adenopathy is seen. Delayed venous phase images show no evidence of obstructive uropathy.      A few shotty right lower quadrant lymph nodes of questionable significance. No evidence of acute inflammatory focus or other clearly acute intra-abdominal or intrapelvic disease is seen.  DICTATED:   10/06/2021 EDITED/ls :   10/06/2021   This report was finalized on 10/7/2021 12:55 PM by Dr. Rajat Hernandez MD.      US Gallbladder    Result Date: 10/6/2021  US Abdomen Ltd INDICATION: Right upper quadrant pain for a week. Epigastric pain. COMPARISON: CT abdomen pelvis from 6:01 PM. FINDINGS: PANCREAS: Obscured by overlying bowel gas. Please refer to the earlier CT scan report. LIVER: No focal mass appreciated on images provided. No intrahepatic biliary ductal dilatation. Common bile duct is 2.2 mm which is normal. I do not see images to evaluate the inferior vena cava or main portal vein. There appear patent on the earlier CT scan. GALLBLADDER: There are multiple shadowing gallstones within the gallbladder. Technologist does not indicate whether or not there is a sonographic Hatfield's sign. Gallbladder wall is not thickened and there is no evidence for pericholecystic fluid. RIGHT KIDNEY: The right kidney measures 9.4 x 5.5 x 5.2 cm dimension. No hydronephrosis. cm..     Multiple shadowing gallstones within the gallbladder. There is no wall thickening, pericholecystic fluid or biliary ductal dilatation appreciated. Technologist does not indicate whether not there is a sonographic Hatfield sign. Please correlate further clinically. Signer Name: Kaylah Nova MD  Signed: 10/6/2021 7:53 PM  Workstation Name: [email or web address]  [facility] C Arm During Surgery    Result Date: 10/8/2021  EXAMINATION: FLUOROSCOPY C-ARM DURING SURGERY-  INDICATION: K85.90-Acute pancreatitis without necrosis or infection, unspecified; K80.70-Calculus of gallbladder and bile duct without cholecystitis  without obstruction; R10.10-Upper abdominal pain, unspecified.  TECHNIQUE: Intraoperative fluoroscopy for improved localization and treatment planning.  COMPARISON: None.  FINDINGS: Intraoperative fluoroscopy with total fluoroscopic time usage 13 seconds and one image saved during intraoperative cholangiogram.      Intraoperative fluoroscopy with total fluoroscopic time usage 13 seconds and one image saved during intraoperative cholangiogram.  D:  10/07/2021 E:  10/08/2021                  Results for orders placed during the hospital encounter of 02/21/20    Adult Transthoracic Echo Complete W/ Cont if Necessary Per Protocol    Interpretation Summary  · Estimated EF appears to be in the range of 61 - 65%.  · Left ventricular systolic function is normal.  · Left ventricular diastolic dysfunction (grade I) consistent with impaired relaxation.  · No significant structural or functional valvular disease.      Plan for Follow-up of Pending Labs/Results: will be called with any unexpected findings/results  Pending Labs     Order Current Status    Tissue Pathology Exam In process    Blood Culture - Blood, Arm, Left Preliminary result    Blood Culture - Blood, Hand, Left Preliminary result        Discharge Details        Discharge Medications      New Medications      Instructions Start Date   amoxicillin-clavulanate 875-125 MG per tablet  Commonly known as: Augmentin   1 tablet, Oral, 2 Times Daily      docusate sodium 100 MG capsule  Commonly known as: Colace   100 mg, Oral, 2 Times Daily      ondansetron 4 MG tablet  Commonly known as: ZOFRAN   4 mg, Oral, Every 6 Hours PRN      oxyCODONE-acetaminophen 5-325 MG per tablet  Commonly known as: Percocet   1 tablet, Oral, Every 4 Hours PRN         Continue These Medications      Instructions Start Date   ALLEGRA ALLERGY PO   Oral      multivitamin tablet tablet  Commonly known as: THERAGRAN   Oral      omeprazole 20 MG capsule  Commonly known as: priLOSEC   20 mg, Oral,  Daily      PROBIOTIC-10 PO   Oral             Allergies   Allergen Reactions   • Benadryl [Diphenhydramine] Rash         Discharge Disposition:  Home or Self Care    Diet:  Hospital:  Diet Order   Procedures   • Diet Regular; Low Fat       Activity:  As tolerated    No lifting more than 10 pounds for the next 2 weeks.  Okay to shower starting today, let warm soapy water run over the wounds.  Do not tub bath for 2 weeks.  Remove dressings tomorrow, leave Steri-Strips in place.    Restrictions or Other Recommendations:  As tolerated       CODE STATUS:    Code Status and Medical Interventions:   Ordered at: 10/07/21 1842     Level Of Support Discussed With:    Patient     Code Status:    CPR     Medical Interventions (Level of Support Prior to Arrest):    Full       No future appointments.    Additional Instructions for the Follow-ups that You Need to Schedule     Discharge Follow-up with Specified Provider: Dr Mariscal 2 weeks   As directed      To: Dr Mariscal 2 weeks                     Flor Junior MD  10/08/21      Time Spent on Discharge:  I spent  20  minutes on this discharge activity which included: face-to-face encounter with the patient, reviewing the data in the system, coordination of the care with the nursing staff as well as consultants, documentation, and entering orders.

## 2021-10-08 NOTE — PROGRESS NOTES
"Patient Name:  Aneudy Mckeon  YOB: 1985  [id number]    Surgery Progress Note    Date of visit: 10/8/2021      Subjective: No acute events overnight.  Pain is controlled.  Denies nausea or vomiting.  Has tolerated regular diet so far.  Has passed flatus and surgery.  T-max 100.2 overnight          Objective:     /68 (BP Location: Left arm, Patient Position: Lying)   Pulse 58   Temp 98.7 °F (37.1 °C) (Oral)   Resp 18   Ht 177.8 cm (70\")   Wt 88.9 kg (196 lb)   SpO2 95%   BMI 28.12 kg/m²     Intake/Output Summary (Last 24 hours) at 10/8/2021 0802  Last data filed at 10/8/2021 0502  Gross per 24 hour   Intake 3495 ml   Output 1275 ml   Net 2220 ml       GEN:   Awake, alert, in no acute distress, resting comfortably in bed   CV:   Regular rate and rhythm  L:  Clear to auscultation bilaterally, not labored on room air   Abd:  Soft, appropriately tender palpation along incisions, incisions are clean dry and intact, nondistended  Ext:  No cyanosis, clubbing, or edema    Recent labs that are back at this time have been reviewed.           Assessment/ Plan:    Mr. Mckeon is a 36-year-old gentleman with history of grade 1 diastolic dysfunction scoliosis with gallstone pancreatitis and concern for cholecystitis    #Gallstone pancreatitis  #Acute cholecystitis  -Postoperative day 1 after laparoscopic cholecystectomy with intraoperative cholangiogram  -Doing well.  Tolerating diet.  Has passed flatus and surgery  -Intraoperative cholangiogram yesterday was clear.  Bilirubin downtrending at 1.9 today.  -Okay for discharge from my standpoint today.  Should follow-up with me in 2 weeks.  No lifting more than 10 pounds for the next 2 weeks.  Okay to shower starting today, let warm soapy water run over the wounds.  Do not tub bath for 2 weeks.  Remove dressings tomorrow, leave Steri-Strips in place.  -Given T-max of 100.2 overnight will discharge on 4 days of Augmentin.  I will E prescribe Augmentin " and Percocet in his discharge med rec      Sonny Mariscal MD  10/8/2021  08:02 EDT

## 2021-10-08 NOTE — OUTREACH NOTE
Prep Survey      Responses   Gnosticism facility patient discharged from?  [facility]   Is LACE score < 7 ?  Yes   Emergency Room discharge w/ pulse ox?  No   Eligibility  [facility]   Date of Admission  10/06/21   Date of Discharge  10/08/21   Discharge diagnosis  Acute gallstone pancreatitis lap chol, sepsis   Does the patient have one of the following disease processes/diagnoses(primary or secondary)?  Sepsis   Comments regarding appointments  please see AVS   Prep survey completed?  Yes          Jeniffer Francisco RN

## 2021-10-08 NOTE — PLAN OF CARE
Goal Outcome Evaluation:  Plan of Care Reviewed With: patient        Progress: improving  Outcome Summary: Patient meeting all ERAS protocaol and ready for D/C.

## 2021-10-08 NOTE — PLAN OF CARE
Goal Outcome Evaluation:  Plan of Care Reviewed With: patient        Progress: improving  Outcome Summary: VSS, RA. Pain managed with PRNs. Pt ambulating, voiding, and tolerating PO. Temp of 100.2 approx after 2300, tylenol given.

## 2021-10-11 ENCOUNTER — TRANSITIONAL CARE MANAGEMENT TELEPHONE ENCOUNTER (OUTPATIENT)
Dept: CALL CENTER | Facility: HOSPITAL | Age: 36
End: 2021-10-11

## 2021-10-11 LAB
CYTO UR: NORMAL
LAB AP CASE REPORT: NORMAL
LAB AP CLINICAL INFORMATION: NORMAL
PATH REPORT.FINAL DX SPEC: NORMAL
PATH REPORT.GROSS SPEC: NORMAL

## 2021-10-11 NOTE — OUTREACH NOTE
Call Center TCM Note      Responses   [facility] patient discharged from? [place] [facility]   Does the patient have one of the following disease processes/diagnoses(primary or secondary)? Sepsis   TCM attempt successful? Yes   Call start time 1032   Call end time 1046   Discharge diagnosis Acute gallstone pancreatitis lap chol, sepsis   Is patient permission given to speak with other caregiver? No   Meds reviewed with patient/caregiver? Yes   Does the patient have all medications related to this admission filled (includes all antibiotics, inhalers, nebulizers,steroids,etc.) Yes   Is the patient taking all medications as directed (includes completed medication regime)? Yes   Does the patient have a primary care provider?  Yes   Comments regarding PCP Hospital Follow Up with Toñito Lorenzo MD Thursday Oct 14, 2021 10:00 AM   Does the patient have an appointment with their PCP within 7 days of discharge? Yes   Has the patient kept scheduled appointments due by today? N/A   Comments Go to Sonny Mariscal MDTuesday Oct 26 at 845am   Has home health visited the patient within 72 hours of discharge? N/A   Psychosocial issues? No   Did the patient receive a copy of their discharge instructions? Yes   Nursing interventions Reviewed instructions with patient   What is the patient's perception of their health status since discharge? Improving   Nursing interventions Nurse provided patient education   Is the patient/caregiver able to teach back Sepsis? S - Shivering,fever or very cold,  S - Sleepy, difficult to arouse,confused   Nursing interventions Nurse provided reassurance to patient   Is patient/caregiver able to teach back steps to recovery at home? Set small, achievable goals for return to baseline health,  Rest and regain strength,  Eat a balanced diet  [Encouraged to do deep breathing exercises, monitor for s/s of wound infection, drink plenty of fluids and diet as tolerated. ]   If the patient is a current smoker, are  they able to teach back resources for cessation? Not a smoker   Is the patient/caregiver able to teach back the hierarchy of who to call/visit for symptoms/problems? PCP, Specialist, Home health nurse, Urgent Care, ED, 911 Yes   TCM call completed? Yes   Wrap up additional comments Patient reports that he removed the steri-strips. He did not know to leave in place. Reports wound edges are together with no drainage.          Rosalee Garrison RN    10/11/2021, 10:46 EDT

## 2021-10-11 NOTE — OUTREACH NOTE
Call Center TCM Note      Responses   [facility] patient discharged from? [place]   Does the patient have one of the following disease processes/diagnoses(primary or secondary)? Sepsis   TCM attempt successful? No   Unsuccessful attempts Attempt 1   Call Status Left message          Rosalee Garrison RN    10/11/2021, 08:36 EDT

## 2021-10-12 LAB
BACTERIA SPEC AEROBE CULT: NORMAL
BACTERIA SPEC AEROBE CULT: NORMAL

## 2021-10-14 ENCOUNTER — OFFICE VISIT (OUTPATIENT)
Dept: FAMILY MEDICINE CLINIC | Facility: CLINIC | Age: 36
End: 2021-10-14

## 2021-10-14 VITALS
TEMPERATURE: 98.2 F | SYSTOLIC BLOOD PRESSURE: 118 MMHG | HEIGHT: 70 IN | HEART RATE: 73 BPM | RESPIRATION RATE: 16 BRPM | WEIGHT: 193.2 LBS | BODY MASS INDEX: 27.66 KG/M2 | OXYGEN SATURATION: 99 % | DIASTOLIC BLOOD PRESSURE: 64 MMHG

## 2021-10-14 DIAGNOSIS — K85.10 ACUTE GALLSTONE PANCREATITIS: Primary | ICD-10-CM

## 2021-10-14 PROBLEM — R07.89 CHEST PAIN, ATYPICAL: Status: RESOLVED | Noted: 2020-02-21 | Resolved: 2021-10-14

## 2021-10-14 PROBLEM — A41.9 SEPSIS, UNSPECIFIED ORGANISM: Status: RESOLVED | Noted: 2021-10-06 | Resolved: 2021-10-14

## 2021-10-14 PROCEDURE — 80053 COMPREHEN METABOLIC PANEL: CPT | Performed by: STUDENT IN AN ORGANIZED HEALTH CARE EDUCATION/TRAINING PROGRAM

## 2021-10-14 PROCEDURE — 99213 OFFICE O/P EST LOW 20 MIN: CPT | Performed by: STUDENT IN AN ORGANIZED HEALTH CARE EDUCATION/TRAINING PROGRAM

## 2021-10-14 RX ORDER — CHLORAL HYDRATE 500 MG
1000 CAPSULE ORAL
COMMUNITY

## 2021-10-14 NOTE — PROGRESS NOTES
Transitional Care Follow Up Visit  Subjective     Aneudy Mckeon is a 36 y.o. male who presents for a transitional care management visit.    Within 48 business hours after discharge our office contacted him via telephone to coordinate his care and needs.      I reviewed and discussed the details of that call along with the discharge summary, hospital problems, inpatient lab results, inpatient diagnostic studies, and consultation reports with Aneudy.     Current outpatient and discharge medications have been reconciled for the patient.    Reviewed by: Toñito Lorenzo MD      Date of TCM Phone Call 10/8/2021   [facility]   Date of Admission 10/6/2021   Date of Discharge 10/8/2021     Risk for Readmission (LACE) Score: 5 (10/8/2021  6:00 AM)      History of Present Illness   Course During Hospital Stay:   Patient is 36-year-old who was admitted from 10/6/2021 to 10/8/2021 for acute gallstone pancreatitis.  Shortly after admission he came febrile and tachycardic.  Patient underwent cholecystectomy and cholangiogram and postoperative cholangiogram was within normal limits.  Patient remained mildly febrile postoperatively and was discharged on Augmentin for total of 5-day course.  Patient did have elevated liver enzymes that did not return to completely normal prior to discharge.  Patient has follow-up with Dr. Mariscal next week.    Since discharge patient is slowly improving.  Patient has some intermittent right upper quadrant pain that is noticed after eating but this is much improved from pain that he had that led to presentation to the hospital.  He is feeling otherwise well with the exception of some incisional site pains.  He has finished his Augmentin.     The following portions of the patient's history were reviewed and updated as appropriate: allergies, current medications, past family history, past medical history, past social history, past surgical history and problem list.    Objective   Physical  Exam  Cardiovascular:      Rate and Rhythm: Normal rate and regular rhythm.   Pulmonary:      Effort: Pulmonary effort is normal.      Breath sounds: Normal breath sounds.   Abdominal:      General: Abdomen is flat.      Palpations: Abdomen is soft.      Comments: Surgical port incision sites clean dry and intact without surrounding erythema.    Mild mild tenderness to the right upper quadrant.  No rebound tenderness.   Neurological:      Mental Status: He is alert.         Assessment/Plan   1. Acute gallstone pancreatitis  - Patient symptoms are gradually improving.  Exam is reassuring today.  - Patient did have some significant elevated liver enzymes associated with this and will ensure they are trending down today.  - Comprehensive Metabolic Panel today  - Keep follow-up with Dr. Mariscal general surgery    Toñito Lorenzo MD  Family Medicine - [facility]

## 2021-10-15 LAB
ALBUMIN SERPL-MCNC: 4.7 G/DL (ref 3.5–5.2)
ALBUMIN/GLOB SERPL: 2 G/DL
ALP SERPL-CCNC: 73 U/L (ref 39–117)
ALT SERPL W P-5'-P-CCNC: 107 U/L (ref 1–41)
ANION GAP SERPL CALCULATED.3IONS-SCNC: 11.8 MMOL/L (ref 5–15)
AST SERPL-CCNC: 27 U/L (ref 1–40)
BILIRUB SERPL-MCNC: 0.5 MG/DL (ref 0–1.2)
BUN SERPL-MCNC: 15 MG/DL (ref 6–20)
BUN/CREAT SERPL: 13.4 (ref 7–25)
CALCIUM SPEC-SCNC: 10.1 MG/DL (ref 8.6–10.5)
CHLORIDE SERPL-SCNC: 102 MMOL/L (ref 98–107)
CO2 SERPL-SCNC: 24.2 MMOL/L (ref 22–29)
CREAT SERPL-MCNC: 1.12 MG/DL (ref 0.76–1.27)
GFR SERPL CREATININE-BSD FRML MDRD: 75 ML/MIN/1.73
GLOBULIN UR ELPH-MCNC: 2.4 GM/DL
GLUCOSE SERPL-MCNC: 88 MG/DL (ref 65–99)
POTASSIUM SERPL-SCNC: 4.3 MMOL/L (ref 3.5–5.2)
PROT SERPL-MCNC: 7.1 G/DL (ref 6–8.5)
SODIUM SERPL-SCNC: 138 MMOL/L (ref 136–145)

## 2022-04-14 ENCOUNTER — LAB (OUTPATIENT)
Dept: LAB | Facility: HOSPITAL | Age: 37
End: 2022-04-14

## 2022-04-14 ENCOUNTER — OFFICE VISIT (OUTPATIENT)
Dept: FAMILY MEDICINE CLINIC | Facility: CLINIC | Age: 37
End: 2022-04-14

## 2022-04-14 VITALS
BODY MASS INDEX: 28.52 KG/M2 | OXYGEN SATURATION: 97 % | HEART RATE: 72 BPM | HEIGHT: 70 IN | SYSTOLIC BLOOD PRESSURE: 130 MMHG | WEIGHT: 199.2 LBS | RESPIRATION RATE: 16 BRPM | TEMPERATURE: 98.6 F | DIASTOLIC BLOOD PRESSURE: 78 MMHG

## 2022-04-14 DIAGNOSIS — R10.13 EPIGASTRIC PAIN: ICD-10-CM

## 2022-04-14 DIAGNOSIS — Z00.01 ENCOUNTER FOR ROUTINE ADULT HEALTH EXAMINATION WITH ABNORMAL FINDINGS: Primary | ICD-10-CM

## 2022-04-14 DIAGNOSIS — M41.25 OTHER IDIOPATHIC SCOLIOSIS, THORACOLUMBAR REGION: ICD-10-CM

## 2022-04-14 DIAGNOSIS — K21.9 GASTROESOPHAGEAL REFLUX DISEASE WITHOUT ESOPHAGITIS: ICD-10-CM

## 2022-04-14 DIAGNOSIS — Z13.220 SCREENING, LIPID: ICD-10-CM

## 2022-04-14 LAB
ALBUMIN SERPL-MCNC: 5.1 G/DL (ref 3.5–5.2)
ALBUMIN/GLOB SERPL: 2.2 G/DL
ALP SERPL-CCNC: 61 U/L (ref 39–117)
ALT SERPL W P-5'-P-CCNC: 47 U/L (ref 1–41)
ANION GAP SERPL CALCULATED.3IONS-SCNC: 11.4 MMOL/L (ref 5–15)
AST SERPL-CCNC: 26 U/L (ref 1–40)
BILIRUB SERPL-MCNC: 0.4 MG/DL (ref 0–1.2)
BUN SERPL-MCNC: 13 MG/DL (ref 6–20)
BUN/CREAT SERPL: 14.1 (ref 7–25)
CALCIUM SPEC-SCNC: 10.2 MG/DL (ref 8.6–10.5)
CHLORIDE SERPL-SCNC: 104 MMOL/L (ref 98–107)
CHOLEST SERPL-MCNC: 189 MG/DL (ref 0–200)
CO2 SERPL-SCNC: 26.6 MMOL/L (ref 22–29)
CREAT SERPL-MCNC: 0.92 MG/DL (ref 0.76–1.27)
EGFRCR SERPLBLD CKD-EPI 2021: 111.9 ML/MIN/1.73
GLOBULIN UR ELPH-MCNC: 2.3 GM/DL
GLUCOSE SERPL-MCNC: 100 MG/DL (ref 65–99)
HDLC SERPL-MCNC: 43 MG/DL (ref 40–60)
LDLC SERPL CALC-MCNC: 125 MG/DL (ref 0–100)
LDLC/HDLC SERPL: 2.84 {RATIO}
LIPASE SERPL-CCNC: 29 U/L (ref 13–60)
POTASSIUM SERPL-SCNC: 4.3 MMOL/L (ref 3.5–5.2)
PROT SERPL-MCNC: 7.4 G/DL (ref 6–8.5)
SODIUM SERPL-SCNC: 142 MMOL/L (ref 136–145)
TRIGL SERPL-MCNC: 119 MG/DL (ref 0–150)
VLDLC SERPL-MCNC: 21 MG/DL (ref 5–40)

## 2022-04-14 PROCEDURE — 3008F BODY MASS INDEX DOCD: CPT | Performed by: STUDENT IN AN ORGANIZED HEALTH CARE EDUCATION/TRAINING PROGRAM

## 2022-04-14 PROCEDURE — 83690 ASSAY OF LIPASE: CPT

## 2022-04-14 PROCEDURE — 99395 PREV VISIT EST AGE 18-39: CPT | Performed by: STUDENT IN AN ORGANIZED HEALTH CARE EDUCATION/TRAINING PROGRAM

## 2022-04-14 PROCEDURE — 80061 LIPID PANEL: CPT | Performed by: STUDENT IN AN ORGANIZED HEALTH CARE EDUCATION/TRAINING PROGRAM

## 2022-04-14 PROCEDURE — 80053 COMPREHEN METABOLIC PANEL: CPT | Performed by: STUDENT IN AN ORGANIZED HEALTH CARE EDUCATION/TRAINING PROGRAM

## 2022-04-14 PROCEDURE — 2014F MENTAL STATUS ASSESS: CPT | Performed by: STUDENT IN AN ORGANIZED HEALTH CARE EDUCATION/TRAINING PROGRAM

## 2022-04-14 RX ORDER — METHOCARBAMOL 750 MG/1
750 TABLET, FILM COATED ORAL 2 TIMES DAILY PRN
Qty: 30 TABLET | Refills: 2 | Status: SHIPPED | OUTPATIENT
Start: 2022-04-14 | End: 2023-01-04

## 2022-04-14 RX ORDER — OMEPRAZOLE 40 MG/1
40 CAPSULE, DELAYED RELEASE ORAL DAILY
Qty: 30 CAPSULE | Refills: 1 | Status: SHIPPED | OUTPATIENT
Start: 2022-04-14 | End: 2022-06-13

## 2022-04-14 RX ORDER — OMEPRAZOLE 20 MG/1
20 CAPSULE, DELAYED RELEASE ORAL DAILY PRN
Qty: 30 CAPSULE | Refills: 11 | OUTPATIENT
Start: 2022-04-14 | End: 2022-10-29

## 2022-04-14 NOTE — PROGRESS NOTES
Male Physical Note      Patient Name: Aneudy Mckeon  : 1985   MRN: [id number]     Subjective    Subjective     Chief Complaint:    Chief Complaint   Patient presents with   • Annual Exam     Physical only today, still has some heartburn, had scoliosis since childhood, has been going to chiropractor for past 2-3 years which helps but still has some muscle spasms       History of Present Illness: Aneudy Mckeon is a 36 y.o. male who is here today for their annual health maintenance and physical.      Past Medical History, Social History, Family History and Care Team were all reviewed with patient and updated as appropriate.     Patient describes a mild epigastric burn and reflux.     Back pain for past 4-5 years, History of scoliosis.   He is seeing chiropractor. Some worsening pain recently.    Fritz mccloud     Medications:     Current Outpatient Medications:   •  Fexofenadine HCl (ALLEGRA ALLERGY PO), Take  by mouth. Not taken since before surgery, Disp: , Rfl:   •  Multiple Vitamin (MULTI-VITAMIN DAILY PO), Take  by mouth. Not since before surgery, Disp: , Rfl:   •  Omega-3 Fatty Acids (fish oil) 1000 MG capsule capsule, Take 1,000 mg by mouth Daily With Breakfast., Disp: , Rfl:   •  docusate sodium (Colace) 100 MG capsule, Take 1 capsule by mouth 2 (Two) Times a Day., Disp: 30 capsule, Rfl: 1  •  methocarbamol (ROBAXIN) 750 MG tablet, Take 1 tablet by mouth 2 (Two) Times a Day As Needed for Muscle Spasms., Disp: 30 tablet, Rfl: 2  •  omeprazole (priLOSEC) 20 MG capsule, Take 1 capsule by mouth Daily As Needed (GERD)., Disp: 30 capsule, Rfl: 11  •  omeprazole (priLOSEC) 40 MG capsule, Take 1 capsule by mouth Daily., Disp: 30 capsule, Rfl: 1    Allergies:   Allergies   Allergen Reactions   • Benadryl [Diphenhydramine] Rash       Immunizations:  Td/Tdap(Booster Q 10 yrs):  uptodate   Flu (Yearly):      Recommend COVID vaccine     Hep C: declines     Diet/Physical activity: balanced diet.  "    Depression: PHQ-2 Depression Screening  Little interest or pleasure in doing things? 0-->not at all   Feeling down, depressed, or hopeless? 0-->not at all   PHQ-2 Total Score 0         Objective   Objective     Physical Exam:  Vital Signs:   Vitals:    04/14/22 0905   BP: 130/78   BP Location: Left arm   Patient Position: Sitting   Cuff Size: Adult   Pulse: 72   Resp: 16   Temp: 98.6 °F (37 °C)   TempSrc: Temporal   SpO2: 97%   Weight: 90.4 kg (199 lb 3.2 oz)   Height: 177.8 cm (70\")     Body mass index is 28.58 kg/m².     Physical Exam  Constitutional:       General: He is not in acute distress.     Appearance: He is not ill-appearing.   Cardiovascular:      Rate and Rhythm: Normal rate and regular rhythm.   Pulmonary:      Effort: Pulmonary effort is normal.      Breath sounds: Normal breath sounds.   Neurological:      Mental Status: He is alert.   Psychiatric:         Thought Content: Thought content normal.       Thoracolumbar scoliosis present.  Right rotation with forward bend.  Negative straight leg raise bilaterally.    Abdomen soft and nontender.  Scars from previous port entry sites.  Procedures    Assessment / Plan      Assessment/Plan:   Diagnoses and all orders for this visit:    1. Encounter for routine adult health examination with abnormal findings (Primary)  -     Comprehensive Metabolic Panel  -     Recommend COVID-vaccine    2. Screening, lipid  -     Lipid Panel    3. Epigastric pain  -     Lipase; Future  -     Likely GERD but will check CMP and lipase given gallstone pancreatitis last year.    4. Gastroesophageal reflux disease without esophagitis  -     omeprazole (priLOSEC) 40 MG capsule; Take 1 capsule by mouth Daily.  Dispense: 30 capsule; Refill: 1  -     omeprazole (priLOSEC) 20 MG capsule; Take 1 capsule by mouth Daily As Needed (GERD).  Dispense: 30 capsule; Refill: 11  -    Take Prilosec 40 mg for 2 months then decrease to 20 mg once daily for flares.  Discussed risk of decreased " bone density and kidney function with Prilosec.     5. Other idiopathic scoliosis, thoracolumbar region  -      chronic worsening  -     methocarbamol (ROBAXIN) 750 MG tablet; Take 1 tablet by mouth 2 (Two) Times a Day As Needed for Muscle Spasms.  Dispense: 30 tablet; Refill: 2  -     Continue chiropractics and exercises through chiropractics.  Offered PT referral but he declines at this time.  Patient states he had Woo angle of 35 degrees previously.  -     I discussed with patient I am happy to refer to scoliosis specialist if he desires in the future.      Follow Up:   Return in about 1 year (around 4/14/2023) for Wellness visit.    Chronic worsening problem and medication management in addition to wellness visit today    Healthcare Maintenance:   Health maintenance counseling included discussion of healthy diet and physical activity, Dental hygiene, and vaccinations.  Aneudy Mckeon voices understanding and acceptance of this advice and will call back with any further questions or concerns. AVS with preventive healthcare tips printed for patient.     Toñito Lorenzo MD  Family Medicine - [facility] [place]

## 2022-06-13 DIAGNOSIS — K21.9 GASTROESOPHAGEAL REFLUX DISEASE WITHOUT ESOPHAGITIS: ICD-10-CM

## 2022-06-13 RX ORDER — OMEPRAZOLE 40 MG/1
40 CAPSULE, DELAYED RELEASE ORAL DAILY
Qty: 30 CAPSULE | Refills: 2 | Status: SHIPPED | OUTPATIENT
Start: 2022-06-13 | End: 2022-09-13

## 2022-09-13 DIAGNOSIS — K21.9 GASTROESOPHAGEAL REFLUX DISEASE WITHOUT ESOPHAGITIS: ICD-10-CM

## 2022-09-13 RX ORDER — OMEPRAZOLE 40 MG/1
40 CAPSULE, DELAYED RELEASE ORAL DAILY
Qty: 30 CAPSULE | Refills: 2 | Status: SHIPPED | OUTPATIENT
Start: 2022-09-13 | End: 2022-12-27

## 2022-11-09 ENCOUNTER — OFFICE VISIT (OUTPATIENT)
Dept: FAMILY MEDICINE CLINIC | Facility: CLINIC | Age: 37
End: 2022-11-09

## 2022-11-09 VITALS
HEIGHT: 70 IN | SYSTOLIC BLOOD PRESSURE: 124 MMHG | OXYGEN SATURATION: 97 % | RESPIRATION RATE: 14 BRPM | TEMPERATURE: 98.7 F | BODY MASS INDEX: 26.63 KG/M2 | HEART RATE: 88 BPM | DIASTOLIC BLOOD PRESSURE: 70 MMHG | WEIGHT: 186 LBS

## 2022-11-09 DIAGNOSIS — M41.25 OTHER IDIOPATHIC SCOLIOSIS, THORACOLUMBAR REGION: ICD-10-CM

## 2022-11-09 DIAGNOSIS — M54.16 LUMBAR RADICULOPATHY: Primary | ICD-10-CM

## 2022-11-09 LAB
BILIRUB BLD-MCNC: NEGATIVE MG/DL
CLARITY, POC: CLEAR
COLOR UR: YELLOW
EXPIRATION DATE: NORMAL
GLUCOSE UR STRIP-MCNC: NEGATIVE MG/DL
KETONES UR QL: NEGATIVE
LEUKOCYTE EST, POC: NEGATIVE
Lab: NORMAL
NITRITE UR-MCNC: NEGATIVE MG/ML
PH UR: 6 [PH] (ref 5–8)
PROT UR STRIP-MCNC: NEGATIVE MG/DL
RBC # UR STRIP: NEGATIVE /UL
SP GR UR: 1.03 (ref 1–1.03)
UROBILINOGEN UR QL: NORMAL

## 2022-11-09 PROCEDURE — 99214 OFFICE O/P EST MOD 30 MIN: CPT | Performed by: STUDENT IN AN ORGANIZED HEALTH CARE EDUCATION/TRAINING PROGRAM

## 2022-11-09 RX ORDER — FLUTICASONE PROPIONATE 50 MCG
2 SPRAY, SUSPENSION (ML) NASAL DAILY
COMMUNITY

## 2022-11-09 RX ORDER — CYCLOBENZAPRINE HCL 10 MG
10 TABLET ORAL 2 TIMES DAILY PRN
Qty: 60 TABLET | Refills: 2 | Status: SHIPPED | OUTPATIENT
Start: 2022-11-09

## 2022-12-03 ENCOUNTER — TELEMEDICINE (OUTPATIENT)
Dept: FAMILY MEDICINE CLINIC | Facility: TELEHEALTH | Age: 37
End: 2022-12-03

## 2022-12-03 DIAGNOSIS — J01.00 ACUTE MAXILLARY SINUSITIS, RECURRENCE NOT SPECIFIED: Primary | ICD-10-CM

## 2022-12-03 PROCEDURE — 99213 OFFICE O/P EST LOW 20 MIN: CPT | Performed by: NURSE PRACTITIONER

## 2022-12-03 RX ORDER — METHYLPREDNISOLONE 4 MG/1
TABLET ORAL
Qty: 21 TABLET | Refills: 0 | Status: SHIPPED | OUTPATIENT
Start: 2022-12-03 | End: 2023-01-04

## 2022-12-03 RX ORDER — AMOXICILLIN AND CLAVULANATE POTASSIUM 875; 125 MG/1; MG/1
1 TABLET, FILM COATED ORAL 2 TIMES DAILY
Qty: 20 TABLET | Refills: 0 | Status: SHIPPED | OUTPATIENT
Start: 2022-12-03 | End: 2022-12-13

## 2022-12-03 NOTE — PATIENT INSTRUCTIONS
Sinusitis, Adult  Sinusitis is inflammation of your sinuses. Sinuses are hollow spaces in the bones around your face. Your sinuses are located:  Around your eyes.  In the middle of your forehead.  Behind your nose.  In your cheekbones.  Mucus normally drains out of your sinuses. When your nasal tissues become inflamed or swollen, mucus can become trapped or blocked. This allows bacteria, viruses, and fungi to grow, which leads to infection. Most infections of the sinuses are caused by a virus.  Sinusitis can develop quickly. It can last for up to 4 weeks (acute) or for more than 12 weeks (chronic). Sinusitis often develops after a cold.  What are the causes?  This condition is caused by anything that creates swelling in the sinuses or stops mucus from draining. This includes:  Allergies.  Asthma.  Infection from bacteria or viruses.  Deformities or blockages in your nose or sinuses.  Abnormal growths in the nose (nasal polyps).  Pollutants, such as chemicals or irritants in the air.  Infection from fungi (rare).  What increases the risk?  You are more likely to develop this condition if you:  Have a weak body defense system (immune system).  Do a lot of swimming or diving.  Overuse nasal sprays.  Smoke.  What are the signs or symptoms?  The main symptoms of this condition are pain and a feeling of pressure around the affected sinuses. Other symptoms include:  Stuffy nose or congestion.  Thick drainage from your nose.  Swelling and warmth over the affected sinuses.  Headache.  Upper toothache.  A cough that may get worse at night.  Extra mucus that collects in the throat or the back of the nose (postnasal drip).  Decreased sense of smell and taste.  Fatigue.  A fever.  Sore throat.  Bad breath.  How is this diagnosed?  This condition is diagnosed based on:  Your symptoms.  Your medical history.  A physical exam.  Tests to find out if your condition is acute or chronic. This may include:  Checking your nose for nasal  polyps.  Viewing your sinuses using a device that has a light (endoscope).  Testing for allergies or bacteria.  Imaging tests, such as an MRI or CT scan.  In rare cases, a bone biopsy may be done to rule out more serious types of fungal sinus disease.  How is this treated?  Treatment for sinusitis depends on the cause and whether your condition is chronic or acute.  If caused by a virus, your symptoms should go away on their own within 10 days. You may be given medicines to relieve symptoms. They include:  Medicines that shrink swollen nasal passages (topical intranasal decongestants).  Medicines that treat allergies (antihistamines).  A spray that eases inflammation of the nostrils (topical intranasal corticosteroids).  Rinses that help get rid of thick mucus in your nose (nasal saline washes).  If caused by bacteria, your health care provider may recommend waiting to see if your symptoms improve. Most bacterial infections will get better without antibiotic medicine. You may be given antibiotics if you have:  A severe infection.  A weak immune system.  If caused by narrow nasal passages or nasal polyps, you may need to have surgery.  Follow these instructions at home:  Medicines  Take, use, or apply over-the-counter and prescription medicines only as told by your health care provider. These may include nasal sprays.  If you were prescribed an antibiotic medicine, take it as told by your health care provider. Do not stop taking the antibiotic even if you start to feel better.  Hydrate and humidify    Drink enough fluid to keep your urine pale yellow. Staying hydrated will help to thin your mucus.  Use a cool mist humidifier to keep the humidity level in your home above 50%.  Inhale steam for 10-15 minutes, 3-4 times a day, or as told by your health care provider. You can do this in the bathroom while a hot shower is running.  Limit your exposure to cool or dry air.  Rest  Rest as much as possible.  Sleep with your  head raised (elevated).  Make sure you get enough sleep each night.  General instructions    Apply a warm, moist washcloth to your face 3-4 times a day or as told by your health care provider. This will help with discomfort.  Wash your hands often with soap and water to reduce your exposure to germs. If soap and water are not available, use hand .  Do not smoke. Avoid being around people who are smoking (secondhand smoke).  Keep all follow-up visits as told by your health care provider. This is important.  Contact a health care provider if:  You have a fever.  Your symptoms get worse.  Your symptoms do not improve within 10 days.  Get help right away if:  You have a severe headache.  You have persistent vomiting.  You have severe pain or swelling around your face or eyes.  You have vision problems.  You develop confusion.  Your neck is stiff.  You have trouble breathing.  Summary  Sinusitis is soreness and inflammation of your sinuses. Sinuses are hollow spaces in the bones around your face.  This condition is caused by nasal tissues that become inflamed or swollen. The swelling traps or blocks the flow of mucus. This allows bacteria, viruses, and fungi to grow, which leads to infection.  If you were prescribed an antibiotic medicine, take it as told by your health care provider. Do not stop taking the antibiotic even if you start to feel better.  Keep all follow-up visits as told by your health care provider. This is important.  This information is not intended to replace advice given to you by your health care provider. Make sure you discuss any questions you have with your health care provider.  Document Revised: 05/20/2019 Document Reviewed: 05/20/2019  [employer] Patient Education © 2022 [employer]

## 2022-12-03 NOTE — PROGRESS NOTES
You have chosen to receive care through a telehealth visit.  Do you consent to use a video/audio connection for your medical care today? Yes     CHIEF COMPLAINT  No chief complaint on file.        HPI  Aneudy Mckeon is a 37 y.o. male  presents with complaint of a couple months history of sore throat, nasal congestion with white/yellow, facial pain/pressure, PND.  Denies cough, fever, wheezing, shortness of breath.  Has not checked for COVID.     Review of Systems   See HPI    Past Medical History:   Diagnosis Date   • GERD (gastroesophageal reflux disease)    • Grade I diastolic dysfunction 10/6/2021   • Pancreatitis     along with gallbladder issues    • Scoliosis        Family History   Problem Relation Age of Onset   • Diabetes type II Mother    • Diabetes type II Father    • Colon cancer Paternal Grandmother    • Diabetes type II Paternal Grandfather    • Coronary artery disease Paternal Grandfather    • No Known Problems Brother        Social History     Socioeconomic History   • Marital status:    Tobacco Use   • Smoking status: Never   • Smokeless tobacco: Never   Vaping Use   • Vaping Use: Never used   Substance and Sexual Activity   • Alcohol use: No   • Drug use: No   • Sexual activity: Defer       Aneudy Mckeon  reports that he has never smoked. He has never used smokeless tobacco..               There were no vitals taken for this visit.    PHYSICAL EXAM  Physical Exam   Constitutional: He is oriented to person, place, and time. He appears well-developed and well-nourished. He does not have a sickly appearance. He does not appear ill.   HENT:   Head: Normocephalic and atraumatic.   Maxillary sinus tenderness   Pulmonary/Chest: Effort normal.  No respiratory distress.  Neurological: He is alert and oriented to person, place, and time.         Diagnoses and all orders for this visit:    1. Acute maxillary sinusitis, recurrence not specified (Primary)  -     amoxicillin-clavulanate  (AUGMENTIN) 875-125 MG per tablet; Take 1 tablet by mouth 2 (Two) Times a Day for 10 days.  Dispense: 20 tablet; Refill: 0  -     methylPREDNISolone (MEDROL) 4 MG dose pack; Take as directed on package instructions.  Dispense: 21 tablet; Refill: 0    --take medications as prescribed  --increase fluids, rest as needed, tylenol or ibuprofen for pain  --f/u in 5-7 days if no improvement        FOLLOW-UP  As discussed during visit with PCP/[facility] if no improvement or Urgent Care/Emergency Department if worsening of symptoms    Patient verbalizes understanding of medication dosage, comfort measures, instructions for treatment and follow-up.    Natalie Rodriguez, APRN  12/03/2022  13:17 EST    The use of a video visit has been reviewed with the patient and verbal informed consent has been obtained. Myself and Aneudy Mckeon participated in this visit. The patient is located [place] [address] [place] [address].    I am located in [place], [place]. Mychart and Zoom were utilized. I spent 8 minutes in the patient's chart for this visit.

## 2022-12-23 DIAGNOSIS — K21.9 GASTROESOPHAGEAL REFLUX DISEASE WITHOUT ESOPHAGITIS: ICD-10-CM

## 2022-12-27 RX ORDER — OMEPRAZOLE 40 MG/1
40 CAPSULE, DELAYED RELEASE ORAL DAILY
Qty: 30 CAPSULE | Refills: 2 | Status: SHIPPED | OUTPATIENT
Start: 2022-12-27 | End: 2023-04-04

## 2023-01-04 ENCOUNTER — OFFICE VISIT (OUTPATIENT)
Dept: FAMILY MEDICINE CLINIC | Facility: CLINIC | Age: 38
End: 2023-01-04
Payer: MEDICAID

## 2023-01-04 VITALS
WEIGHT: 193.8 LBS | RESPIRATION RATE: 16 BRPM | DIASTOLIC BLOOD PRESSURE: 82 MMHG | SYSTOLIC BLOOD PRESSURE: 136 MMHG | BODY MASS INDEX: 27.75 KG/M2 | OXYGEN SATURATION: 99 % | HEIGHT: 70 IN | HEART RATE: 85 BPM | TEMPERATURE: 97.5 F

## 2023-01-04 DIAGNOSIS — J32.9 CHRONIC SINUSITIS, UNSPECIFIED LOCATION: ICD-10-CM

## 2023-01-04 DIAGNOSIS — M41.25 OTHER IDIOPATHIC SCOLIOSIS, THORACOLUMBAR REGION: ICD-10-CM

## 2023-01-04 DIAGNOSIS — M54.16 LUMBAR RADICULOPATHY: Primary | ICD-10-CM

## 2023-01-04 PROCEDURE — 99214 OFFICE O/P EST MOD 30 MIN: CPT | Performed by: STUDENT IN AN ORGANIZED HEALTH CARE EDUCATION/TRAINING PROGRAM

## 2023-01-04 PROCEDURE — 1159F MED LIST DOCD IN RCRD: CPT | Performed by: STUDENT IN AN ORGANIZED HEALTH CARE EDUCATION/TRAINING PROGRAM

## 2023-01-04 NOTE — PROGRESS NOTES
Established Patient Office Visit        Subjective      Chief Complaint:  Back Pain (Follow up on lumbar radiculopathy, declines flu vaccine)      History of Present Illness: Aneudy Mckeon is a 37 y.o. male who presents for back pain.     Some improvement with PT and also uses inversion table.  Still bothersome pain.  Has radiation around the right hip and down the right lateral leg as well.    4-5 days ago had worsening cough and coughed up minimal blood. It is improving.       Patient Active Problem List   Diagnosis   • Palpitations   • Environmental and seasonal allergies   • GERD (gastroesophageal reflux disease)   • Acute gallstone pancreatitis   • Grade I diastolic dysfunction   • Elevated BP without diagnosis of hypertension   • Acute pancreatitis   • Other idiopathic scoliosis, thoracolumbar region         Current Outpatient Medications:   •  cyclobenzaprine (FLEXERIL) 10 MG tablet, Take 1 tablet by mouth 2 (Two) Times a Day As Needed for Muscle Spasms., Disp: 60 tablet, Rfl: 2  •  Fexofenadine HCl (ALLEGRA ALLERGY PO), Take  by mouth. Not taken since before surgery, Disp: , Rfl:   •  fluticasone (FLONASE) 50 MCG/ACT nasal spray, 2 sprays into the nostril(s) as directed by provider Daily. Otc, Disp: , Rfl:   •  Multiple Vitamin (MULTI-VITAMIN DAILY PO), Take  by mouth. Not since before surgery, Disp: , Rfl:   •  Omega-3 Fatty Acids (fish oil) 1000 MG capsule capsule, Take 1,000 mg by mouth Daily With Breakfast., Disp: , Rfl:   •  omeprazole (priLOSEC) 40 MG capsule, TAKE 1 CAPSULE BY MOUTH DAILY, Disp: 30 capsule, Rfl: 2       Objective     Physical Exam:   Vital Signs:   /82 (BP Location: Left arm, Patient Position: Sitting, Cuff Size: Adult)   Pulse 85   Temp 97.5 °F (36.4 °C) (Temporal)   Resp 16   Ht 177.8 cm (70\")   Wt 87.9 kg (193 lb 12.8 oz)   SpO2 99%   BMI 27.81 kg/m²      Physical Exam  Constitutional:       General: He is not in acute distress.     Appearance: He is not  ill-appearing.   Cardiovascular:      Rate and Rhythm: Normal rate and regular rhythm.   Pulmonary:      Effort: Pulmonary effort is normal.      Breath sounds: Normal breath sounds.   Neurological:      Mental Status: He is alert.   Psychiatric:         Thought Content: Thought content normal.   Tenderness to lumbar paraspinals bilaterally.  Normal strength sensation lower extremities bilaterally         Assessment / Plan      Assessment/Plan:   Diagnoses and all orders for this visit:    1. Lumbar radiculopathy (Primary)  -     MRI Lumbar Spine Without Contrast; Future    2. Other idiopathic scoliosis, thoracolumbar region  -     MRI Lumbar Spine Without Contrast; Future    3. Chronic sinusitis, unspecified location       Chronic worsening problem.  Muscle relaxer is helping. Continue flexeril.  PT > 6 weeks with mild improvement. Has improvement with inversion table. Takes ibuprofen without complete relief. Get MRI and consider pain management referral.     Sinusitis: Already passed over round antibiotics do not feel it is necessary given.  Nasal saline rinse. Ok to try afrin nasal spray up to 3 days.  If not improving will refer to ENT    Follow Up:   No follow-ups on file.      MDM:     Toñito Lorenzo MD  Family Medicine - [facility]

## 2023-02-02 ENCOUNTER — HOSPITAL ENCOUNTER (OUTPATIENT)
Dept: MRI IMAGING | Facility: HOSPITAL | Age: 38
Discharge: HOME | End: 2023-02-02
Admitting: STUDENT IN AN ORGANIZED HEALTH CARE EDUCATION/TRAINING PROGRAM
Payer: MEDICAID

## 2023-02-02 DIAGNOSIS — M54.16 LUMBAR RADICULOPATHY: ICD-10-CM

## 2023-02-02 DIAGNOSIS — M41.25 OTHER IDIOPATHIC SCOLIOSIS, THORACOLUMBAR REGION: ICD-10-CM

## 2023-02-02 PROCEDURE — 72148 MRI LUMBAR SPINE W/O DYE: CPT

## 2023-04-04 DIAGNOSIS — K21.9 GASTROESOPHAGEAL REFLUX DISEASE WITHOUT ESOPHAGITIS: ICD-10-CM

## 2023-04-04 RX ORDER — OMEPRAZOLE 40 MG/1
40 CAPSULE, DELAYED RELEASE ORAL DAILY
Qty: 30 CAPSULE | Refills: 2 | Status: SHIPPED | OUTPATIENT
Start: 2023-04-04

## 2023-05-10 ENCOUNTER — LAB (OUTPATIENT)
Dept: LAB | Facility: HOSPITAL | Age: 38
End: 2023-05-10
Payer: MEDICAID

## 2023-05-10 ENCOUNTER — OFFICE VISIT (OUTPATIENT)
Dept: FAMILY MEDICINE CLINIC | Facility: CLINIC | Age: 38
End: 2023-05-10
Payer: MEDICAID

## 2023-05-10 VITALS
OXYGEN SATURATION: 100 % | WEIGHT: 194.6 LBS | SYSTOLIC BLOOD PRESSURE: 130 MMHG | HEIGHT: 70 IN | DIASTOLIC BLOOD PRESSURE: 68 MMHG | BODY MASS INDEX: 27.86 KG/M2 | HEART RATE: 67 BPM | TEMPERATURE: 97.7 F | RESPIRATION RATE: 21 BRPM

## 2023-05-10 DIAGNOSIS — R00.2 PALPITATIONS: ICD-10-CM

## 2023-05-10 DIAGNOSIS — Z00.00 ENCOUNTER FOR ROUTINE ADULT HEALTH EXAMINATION WITHOUT ABNORMAL FINDINGS: ICD-10-CM

## 2023-05-10 DIAGNOSIS — L72.9 CYST OF SKIN: ICD-10-CM

## 2023-05-10 LAB
ALBUMIN SERPL-MCNC: 4.5 G/DL (ref 3.5–5.2)
ALBUMIN/GLOB SERPL: 1.9 G/DL
ALP SERPL-CCNC: 57 U/L (ref 39–117)
ALT SERPL W P-5'-P-CCNC: 39 U/L (ref 1–41)
ANION GAP SERPL CALCULATED.3IONS-SCNC: 11.1 MMOL/L (ref 5–15)
AST SERPL-CCNC: 24 U/L (ref 1–40)
BASOPHILS # BLD AUTO: 0.05 10*3/MM3 (ref 0–0.2)
BASOPHILS NFR BLD AUTO: 0.9 % (ref 0–1.5)
BILIRUB SERPL-MCNC: 0.4 MG/DL (ref 0–1.2)
BUN SERPL-MCNC: 16 MG/DL (ref 6–20)
BUN/CREAT SERPL: 15.7 (ref 7–25)
CALCIUM SPEC-SCNC: 9.7 MG/DL (ref 8.6–10.5)
CHLORIDE SERPL-SCNC: 105 MMOL/L (ref 98–107)
CHOLEST SERPL-MCNC: 183 MG/DL (ref 0–200)
CO2 SERPL-SCNC: 24.9 MMOL/L (ref 22–29)
CREAT SERPL-MCNC: 1.02 MG/DL (ref 0.76–1.27)
DEPRECATED RDW RBC AUTO: 39.6 FL (ref 37–54)
EGFRCR SERPLBLD CKD-EPI 2021: 98.3 ML/MIN/1.73
EOSINOPHIL # BLD AUTO: 0.08 10*3/MM3 (ref 0–0.4)
EOSINOPHIL NFR BLD AUTO: 1.4 % (ref 0.3–6.2)
ERYTHROCYTE [DISTWIDTH] IN BLOOD BY AUTOMATED COUNT: 12.1 % (ref 12.3–15.4)
GLOBULIN UR ELPH-MCNC: 2.4 GM/DL
GLUCOSE SERPL-MCNC: 86 MG/DL (ref 65–99)
HCT VFR BLD AUTO: 44.5 % (ref 37.5–51)
HDLC SERPL-MCNC: 42 MG/DL (ref 40–60)
HGB BLD-MCNC: 15.7 G/DL (ref 13–17.7)
IMM GRANULOCYTES # BLD AUTO: 0.04 10*3/MM3 (ref 0–0.05)
IMM GRANULOCYTES NFR BLD AUTO: 0.7 % (ref 0–0.5)
LDLC SERPL CALC-MCNC: 101 MG/DL (ref 0–100)
LDLC/HDLC SERPL: 2.25 {RATIO}
LYMPHOCYTES # BLD AUTO: 1.19 10*3/MM3 (ref 0.7–3.1)
LYMPHOCYTES NFR BLD AUTO: 20.5 % (ref 19.6–45.3)
MCH RBC QN AUTO: 31.3 PG (ref 26.6–33)
MCHC RBC AUTO-ENTMCNC: 35.3 G/DL (ref 31.5–35.7)
MCV RBC AUTO: 88.6 FL (ref 79–97)
MONOCYTES # BLD AUTO: 0.52 10*3/MM3 (ref 0.1–0.9)
MONOCYTES NFR BLD AUTO: 9 % (ref 5–12)
NEUTROPHILS NFR BLD AUTO: 3.92 10*3/MM3 (ref 1.7–7)
NEUTROPHILS NFR BLD AUTO: 67.5 % (ref 42.7–76)
NRBC BLD AUTO-RTO: 0 /100 WBC (ref 0–0.2)
PLATELET # BLD AUTO: 192 10*3/MM3 (ref 140–450)
PMV BLD AUTO: 10.6 FL (ref 6–12)
POTASSIUM SERPL-SCNC: 4 MMOL/L (ref 3.5–5.2)
PROT SERPL-MCNC: 6.9 G/DL (ref 6–8.5)
RBC # BLD AUTO: 5.02 10*6/MM3 (ref 4.14–5.8)
SODIUM SERPL-SCNC: 141 MMOL/L (ref 136–145)
TRIGL SERPL-MCNC: 232 MG/DL (ref 0–150)
TSH SERPL DL<=0.05 MIU/L-ACNC: 2.49 UIU/ML (ref 0.27–4.2)
VLDLC SERPL-MCNC: 40 MG/DL (ref 5–40)
WBC NRBC COR # BLD: 5.8 10*3/MM3 (ref 3.4–10.8)

## 2023-05-10 PROCEDURE — 80061 LIPID PANEL: CPT | Performed by: STUDENT IN AN ORGANIZED HEALTH CARE EDUCATION/TRAINING PROGRAM

## 2023-05-10 PROCEDURE — 80050 GENERAL HEALTH PANEL: CPT | Performed by: STUDENT IN AN ORGANIZED HEALTH CARE EDUCATION/TRAINING PROGRAM

## 2023-05-10 NOTE — PROGRESS NOTES
Male Physical Note      Patient Name: Aneudy Mckeon  : 1985   MRN: [id number]     Subjective    Subjective     Chief Complaint:    Chief Complaint   Patient presents with   • Annual Exam       History of Present Illness: Aneudy Mckeon is a 37 y.o. male who is here today for their annual health maintenance and physical.      Palpitations for about 5 seconds at a time. Occasional dizziness with this. Worse with caffeine.     Treadmill stress test, ECHO and holter reassuring in .     Skin change to the left shoulder for 5 years.    Past Medical History, Social History, Family History and Care Team were all reviewed with patient and updated as appropriate.     Medications:     Current Outpatient Medications:   •  cyclobenzaprine (FLEXERIL) 10 MG tablet, Take 1 tablet by mouth 2 (Two) Times a Day As Needed for Muscle Spasms., Disp: 60 tablet, Rfl: 2  •  fluticasone (FLONASE) 50 MCG/ACT nasal spray, 2 sprays into the nostril(s) as directed by provider Daily. Otc, Disp: , Rfl:   •  Multiple Vitamin (MULTI-VITAMIN DAILY PO), Take  by mouth. Not since before surgery, Disp: , Rfl:   •  Omega-3 Fatty Acids (fish oil) 1000 MG capsule capsule, Take 1 capsule by mouth Daily With Breakfast., Disp: , Rfl:   •  omeprazole (priLOSEC) 40 MG capsule, TAKE 1 CAPSULE BY MOUTH DAILY, Disp: 30 capsule, Rfl: 2    Allergies:   Allergies   Allergen Reactions   • Benadryl [Diphenhydramine] Rash       Immunizations:  Td/Tdap(Booster Q 10 yrs):  uptodate    Diet/Physical activity: working on healthy diet     Depression: PHQ-2 Depression Screening  Little interest or pleasure in doing things? 0-->not at all   Feeling down, depressed, or hopeless? 0-->not at all   PHQ-2 Total Score 0       Objective   Objective     Physical Exam:  Vital Signs:   Vitals:    05/10/23 1036   BP: 130/68   Pulse: 67   Resp: 21   Temp: 97.7 °F (36.5 °C)   TempSrc: Temporal   SpO2: 100%   Weight: 88.3 kg (194 lb 9.6 oz)   Height: 177.8 cm  "(70\")     Body mass index is 27.92 kg/m².     Physical Exam  Constitutional:       General: He is not in acute distress.     Appearance: He is not ill-appearing.   Cardiovascular:      Rate and Rhythm: Normal rate and regular rhythm.   Pulmonary:      Effort: Pulmonary effort is normal.      Breath sounds: Normal breath sounds.   Neurological:      Mental Status: He is alert.   Psychiatric:         Thought Content: Thought content normal.     Cystic like lesion/keloid to the left posterior shoulder/scapula. Normal skin pigmentation.     Procedures    Assessment / Plan      Assessment/Plan:   Diagnoses and all orders for this visit:    1. Encounter for routine adult health examination without abnormal findings  -     TSH Rfx On Abnormal To Free T4  -     Comprehensive Metabolic Panel  -     Lipid Panel  -     CBC & Differential    2. Palpitations  Assessment & Plan:  Previous reassuring work up 2020. Lifestyle changes discussed. F/u for worsening.     Orders:  -     TSH Rfx On Abnormal To Free T4  -     CBC & Differential    3. Cyst of skin   F/u for worsening skin changes     Follow Up:   No follow-ups on file.    Healthcare Maintenance:   Health maintenance counseling included discussion of healthy diet and physical activity, Dental hygiene, and vaccinations.  Aneudy Mckeon voices understanding and acceptance of this advice and will call back with any further questions or concerns. AVS with preventive healthcare tips printed for patient.     Toñito Lorenzo MD  Family Medicine - [facility] [place] [facility] [place]    "

## 2023-08-13 DIAGNOSIS — K21.9 GASTROESOPHAGEAL REFLUX DISEASE WITHOUT ESOPHAGITIS: ICD-10-CM

## 2023-08-13 DIAGNOSIS — M54.16 LUMBAR RADICULOPATHY: ICD-10-CM

## 2023-08-14 RX ORDER — CYCLOBENZAPRINE HCL 10 MG
10 TABLET ORAL 2 TIMES DAILY PRN
Qty: 60 TABLET | Refills: 2 | Status: SHIPPED | OUTPATIENT
Start: 2023-08-14

## 2023-08-14 RX ORDER — OMEPRAZOLE 40 MG/1
40 CAPSULE, DELAYED RELEASE ORAL DAILY
Qty: 30 CAPSULE | Refills: 2 | Status: SHIPPED | OUTPATIENT
Start: 2023-08-14

## 2023-10-18 DIAGNOSIS — M54.16 LUMBAR RADICULOPATHY: ICD-10-CM

## 2023-10-18 RX ORDER — CYCLOBENZAPRINE HCL 10 MG
10 TABLET ORAL 2 TIMES DAILY PRN
Qty: 60 TABLET | Refills: 2 | Status: SHIPPED | OUTPATIENT
Start: 2023-10-18

## 2023-11-15 ENCOUNTER — OFFICE VISIT (OUTPATIENT)
Dept: FAMILY MEDICINE CLINIC | Facility: CLINIC | Age: 38
End: 2023-11-15
Payer: COMMERCIAL

## 2023-11-15 ENCOUNTER — LAB (OUTPATIENT)
Dept: LAB | Facility: HOSPITAL | Age: 38
End: 2023-11-15
Payer: COMMERCIAL

## 2023-11-15 VITALS
WEIGHT: 205 LBS | TEMPERATURE: 98.7 F | DIASTOLIC BLOOD PRESSURE: 78 MMHG | OXYGEN SATURATION: 99 % | HEART RATE: 84 BPM | SYSTOLIC BLOOD PRESSURE: 140 MMHG | HEIGHT: 70 IN | BODY MASS INDEX: 29.35 KG/M2 | RESPIRATION RATE: 18 BRPM

## 2023-11-15 DIAGNOSIS — K90.89 BILE SALT-INDUCED DIARRHEA: ICD-10-CM

## 2023-11-15 DIAGNOSIS — R19.7 DIARRHEA, UNSPECIFIED TYPE: ICD-10-CM

## 2023-11-15 DIAGNOSIS — R10.11 RUQ PAIN: ICD-10-CM

## 2023-11-15 LAB
ALBUMIN SERPL-MCNC: 4.7 G/DL (ref 3.5–5.2)
ALBUMIN/GLOB SERPL: 1.8 G/DL
ALP SERPL-CCNC: 62 U/L (ref 39–117)
ALT SERPL W P-5'-P-CCNC: 65 U/L (ref 1–41)
ANION GAP SERPL CALCULATED.3IONS-SCNC: 10 MMOL/L (ref 5–15)
AST SERPL-CCNC: 30 U/L (ref 1–40)
BASOPHILS # BLD AUTO: 0.05 10*3/MM3 (ref 0–0.2)
BASOPHILS NFR BLD AUTO: 0.7 % (ref 0–1.5)
BILIRUB SERPL-MCNC: 0.3 MG/DL (ref 0–1.2)
BUN SERPL-MCNC: 16 MG/DL (ref 6–20)
BUN/CREAT SERPL: 16 (ref 7–25)
CALCIUM SPEC-SCNC: 9.9 MG/DL (ref 8.6–10.5)
CHLORIDE SERPL-SCNC: 103 MMOL/L (ref 98–107)
CO2 SERPL-SCNC: 28 MMOL/L (ref 22–29)
CREAT SERPL-MCNC: 1 MG/DL (ref 0.76–1.27)
DEPRECATED RDW RBC AUTO: 39.8 FL (ref 37–54)
EGFRCR SERPLBLD CKD-EPI 2021: 100 ML/MIN/1.73
EOSINOPHIL # BLD AUTO: 0.12 10*3/MM3 (ref 0–0.4)
EOSINOPHIL NFR BLD AUTO: 1.8 % (ref 0.3–6.2)
ERYTHROCYTE [DISTWIDTH] IN BLOOD BY AUTOMATED COUNT: 12.2 % (ref 12.3–15.4)
GLOBULIN UR ELPH-MCNC: 2.6 GM/DL
GLUCOSE SERPL-MCNC: 89 MG/DL (ref 65–99)
HCT VFR BLD AUTO: 44.6 % (ref 37.5–51)
HGB BLD-MCNC: 15.4 G/DL (ref 13–17.7)
IMM GRANULOCYTES # BLD AUTO: 0.04 10*3/MM3 (ref 0–0.05)
IMM GRANULOCYTES NFR BLD AUTO: 0.6 % (ref 0–0.5)
LIPASE SERPL-CCNC: 35 U/L (ref 13–60)
LYMPHOCYTES # BLD AUTO: 1.49 10*3/MM3 (ref 0.7–3.1)
LYMPHOCYTES NFR BLD AUTO: 22.2 % (ref 19.6–45.3)
MCH RBC QN AUTO: 31.2 PG (ref 26.6–33)
MCHC RBC AUTO-ENTMCNC: 34.5 G/DL (ref 31.5–35.7)
MCV RBC AUTO: 90.3 FL (ref 79–97)
MONOCYTES # BLD AUTO: 0.65 10*3/MM3 (ref 0.1–0.9)
MONOCYTES NFR BLD AUTO: 9.7 % (ref 5–12)
NEUTROPHILS NFR BLD AUTO: 4.37 10*3/MM3 (ref 1.7–7)
NEUTROPHILS NFR BLD AUTO: 65 % (ref 42.7–76)
NRBC BLD AUTO-RTO: 0 /100 WBC (ref 0–0.2)
PLATELET # BLD AUTO: 194 10*3/MM3 (ref 140–450)
PMV BLD AUTO: 10.1 FL (ref 6–12)
POTASSIUM SERPL-SCNC: 4 MMOL/L (ref 3.5–5.2)
PROT SERPL-MCNC: 7.3 G/DL (ref 6–8.5)
RBC # BLD AUTO: 4.94 10*6/MM3 (ref 4.14–5.8)
SODIUM SERPL-SCNC: 141 MMOL/L (ref 136–145)
WBC NRBC COR # BLD: 6.72 10*3/MM3 (ref 3.4–10.8)

## 2023-11-15 PROCEDURE — 99214 OFFICE O/P EST MOD 30 MIN: CPT | Performed by: STUDENT IN AN ORGANIZED HEALTH CARE EDUCATION/TRAINING PROGRAM

## 2023-11-15 PROCEDURE — 80053 COMPREHEN METABOLIC PANEL: CPT | Performed by: STUDENT IN AN ORGANIZED HEALTH CARE EDUCATION/TRAINING PROGRAM

## 2023-11-15 PROCEDURE — 83690 ASSAY OF LIPASE: CPT | Performed by: STUDENT IN AN ORGANIZED HEALTH CARE EDUCATION/TRAINING PROGRAM

## 2023-11-15 PROCEDURE — 85025 COMPLETE CBC W/AUTO DIFF WBC: CPT | Performed by: STUDENT IN AN ORGANIZED HEALTH CARE EDUCATION/TRAINING PROGRAM

## 2023-11-15 RX ORDER — DICYCLOMINE HYDROCHLORIDE 10 MG/1
10 CAPSULE ORAL 4 TIMES DAILY PRN
Qty: 30 CAPSULE | Refills: 1 | Status: SHIPPED | OUTPATIENT
Start: 2023-11-15

## 2023-11-15 RX ORDER — MONTELUKAST SODIUM 4 MG/1
2 TABLET, CHEWABLE ORAL 2 TIMES DAILY
Qty: 120 TABLET | Refills: 2 | Status: SHIPPED | OUTPATIENT
Start: 2023-11-15 | End: 2023-11-17 | Stop reason: SDUPTHER

## 2023-11-15 RX ORDER — OMEPRAZOLE 20 MG/1
CAPSULE, DELAYED RELEASE ORAL EVERY 24 HOURS
COMMUNITY
End: 2023-11-16

## 2023-11-15 RX ORDER — MULTIPLE VITAMINS W/ MINERALS TAB 9MG-400MCG
TAB ORAL EVERY 24 HOURS
COMMUNITY

## 2023-11-15 NOTE — PROGRESS NOTES
Established Patient Office Visit        Subjective      Chief Complaint:  Abdominal Pain (Abdominal pain in right side of stomach, within the last 6 months having episodes of pain but the last 3-4 week pain in right side.)      History of Present Illness: Aneudy Mckeon is a 38 y.o. male who presents for ruq pain. Worse at night. Pain is constant. It is 4/10 pain. Can be 7/10 pain. Not worse with movement. Has episode of loose stools about 2 days ago. Nausea at times. Often comes with loose stools     Hx of CCY with pancreatitis on 2021.       Patient Active Problem List   Diagnosis    Palpitations    Environmental and seasonal allergies    GERD (gastroesophageal reflux disease)    Acute gallstone pancreatitis    Grade I diastolic dysfunction    Elevated BP without diagnosis of hypertension    Acute pancreatitis    Other idiopathic scoliosis, thoracolumbar region    Cyst of skin         Current Outpatient Medications:     cyclobenzaprine (FLEXERIL) 10 MG tablet, TAKE 1 TABLET BY MOUTH TWICE DAILY AS NEEDED FOR MUSCLE SPASMS, Disp: 60 tablet, Rfl: 2    fluticasone (FLONASE) 50 MCG/ACT nasal spray, 2 sprays into the nostril(s) as directed by provider Daily. Otc, Disp: , Rfl:     Multiple Vitamin (MULTI-VITAMIN DAILY PO), Take  by mouth. Not since before surgery, Disp: , Rfl:     Omega-3 Fatty Acids (fish oil) 1000 MG capsule capsule, Take 1 capsule by mouth Daily With Breakfast., Disp: , Rfl:     colestipol (COLESTID) 1 g tablet, Take 2 tablets by mouth 2 (Two) Times a Day., Disp: 120 tablet, Rfl: 2    dicyclomine (BENTYL) 10 MG capsule, Take 1 capsule by mouth 4 (Four) Times a Day As Needed for Abdominal Cramping., Disp: 30 capsule, Rfl: 1    multivitamin with minerals (MULTIVITAMIN ADULT PO), Daily. (Patient not taking: Reported on 11/15/2023), Disp: , Rfl:        Objective     Physical Exam:   Vital Signs:   /78 (BP Location: Right arm, Patient Position: Sitting, Cuff Size: Adult)   Pulse 84   Temp  "98.7 °F (37.1 °C) (Temporal)   Resp 18   Ht 177.8 cm (70\")   Wt 93 kg (205 lb)   SpO2 99%   BMI 29.41 kg/m²      Physical Exam  Constitutional:       General: He is not in acute distress.     Appearance: He is not ill-appearing.   Cardiovascular:      Rate and Rhythm: Normal rate and regular rhythm.   Pulmonary:      Effort: Pulmonary effort is normal.      Breath sounds: Normal breath sounds.   Neurological:      Mental Status: He is alert.   Psychiatric:         Thought Content: Thought content normal.   Localized tenderness to the right upper quadrant just inferior to the rib margin.  Surgical port scars appear within normal limits           Assessment / Plan      Assessment/Plan:   Diagnoses and all orders for this visit:    1. RUQ pain  -     Comprehensive Metabolic Panel; Future  -     Lipase; Future  -     CBC & Differential; Future  -     US Liver; Future  -     dicyclomine (BENTYL) 10 MG capsule; Take 1 capsule by mouth 4 (Four) Times a Day As Needed for Abdominal Cramping.  Dispense: 30 capsule; Refill: 1  -     Comprehensive Metabolic Panel  -     Lipase  -     CBC & Differential    2. Bile salt-induced diarrhea  -     colestipol (COLESTID) 1 g tablet; Take 2 tablets by mouth 2 (Two) Times a Day.  Dispense: 120 tablet; Refill: 2    3. Diarrhea, unspecified type  -     colestipol (COLESTID) 1 g tablet; Take 2 tablets by mouth 2 (Two) Times a Day.  Dispense: 120 tablet; Refill: 2       Question abdominal wall pain or postop changes from cholecystectomy although its been a while since he has had this procedure.  Likely has some IBS diarrhea or diarrhea related to his gallbladder surgery.    Check ultrasound first if unrevealing would proceed to CT scan.    Start colestipol Bentyl and IBgard peppermint oil  Low FODMAP diet      Follow Up:   Return in about 6 weeks (around 12/27/2023) for Follow-up.      MDM: New problem uncertain prognosis requiring further evaluation labs prescription drugs    Luke " MD Maura  [place] [facility] [place]

## 2023-11-17 DIAGNOSIS — R19.7 DIARRHEA, UNSPECIFIED TYPE: ICD-10-CM

## 2023-11-17 DIAGNOSIS — K90.89 BILE SALT-INDUCED DIARRHEA: ICD-10-CM

## 2023-11-17 RX ORDER — MONTELUKAST SODIUM 4 MG/1
2 TABLET, CHEWABLE ORAL 2 TIMES DAILY
Qty: 120 TABLET | Refills: 2 | Status: SHIPPED | OUTPATIENT
Start: 2023-11-17

## 2023-12-18 ENCOUNTER — HOSPITAL ENCOUNTER (OUTPATIENT)
Dept: ULTRASOUND IMAGING | Facility: HOSPITAL | Age: 38
Discharge: HOME | End: 2023-12-18
Admitting: STUDENT IN AN ORGANIZED HEALTH CARE EDUCATION/TRAINING PROGRAM
Payer: COMMERCIAL

## 2023-12-18 DIAGNOSIS — R10.11 RUQ PAIN: ICD-10-CM

## 2023-12-18 PROCEDURE — 76705 ECHO EXAM OF ABDOMEN: CPT

## 2024-01-05 ENCOUNTER — OFFICE VISIT (OUTPATIENT)
Dept: FAMILY MEDICINE CLINIC | Facility: CLINIC | Age: 39
End: 2024-01-05
Payer: COMMERCIAL

## 2024-01-05 VITALS
RESPIRATION RATE: 20 BRPM | TEMPERATURE: 98.7 F | DIASTOLIC BLOOD PRESSURE: 82 MMHG | HEIGHT: 70 IN | WEIGHT: 205.4 LBS | SYSTOLIC BLOOD PRESSURE: 134 MMHG | HEART RATE: 72 BPM | BODY MASS INDEX: 29.41 KG/M2

## 2024-01-05 DIAGNOSIS — K58.0 IRRITABLE BOWEL SYNDROME WITH DIARRHEA: ICD-10-CM

## 2024-01-05 DIAGNOSIS — K90.89 BILE SALT-INDUCED DIARRHEA: ICD-10-CM

## 2024-01-05 DIAGNOSIS — K21.9 GASTROESOPHAGEAL REFLUX DISEASE WITHOUT ESOPHAGITIS: Primary | ICD-10-CM

## 2024-01-05 DIAGNOSIS — R19.7 DIARRHEA, UNSPECIFIED TYPE: ICD-10-CM

## 2024-01-05 PROCEDURE — 99214 OFFICE O/P EST MOD 30 MIN: CPT | Performed by: STUDENT IN AN ORGANIZED HEALTH CARE EDUCATION/TRAINING PROGRAM

## 2024-01-05 RX ORDER — L.ACID,CASEI/B.ANIMAL/S.THERMO 16B CELL
1 CAPSULE ORAL DAILY
COMMUNITY

## 2024-01-05 RX ORDER — OMEPRAZOLE 20 MG/1
20 CAPSULE, DELAYED RELEASE ORAL 2 TIMES DAILY
Qty: 60 CAPSULE | Refills: 5 | Status: SHIPPED | OUTPATIENT
Start: 2024-01-05

## 2024-01-05 RX ORDER — MONTELUKAST SODIUM 4 MG/1
1 TABLET, CHEWABLE ORAL 2 TIMES DAILY
Qty: 120 TABLET | Refills: 2 | Status: SHIPPED | OUTPATIENT
Start: 2024-01-05

## 2024-01-05 NOTE — ASSESSMENT & PLAN NOTE
Taking pepcid BID and omeprazole OTC   Switch to omeprazole 20 mg BID for 1 month then back to qday. Trial off pepcid.   Consider h pylori in future. Declines today.

## 2024-01-05 NOTE — PROGRESS NOTES
Established Patient Office Visit        Subjective      Chief Complaint:  Abdominal Pain (6 week right upper abdominal pain)    Answers submitted by the patient for this visit:  Primary Reason for Visit (Submitted on 1/4/2024)  What is the primary reason for your visit?: Abdominal Pain  Abdominal Pain Questionnaire (Submitted on 1/4/2024)  Chief Complaint: Abdominal pain  Chronicity: recurrent  Onset: more than 1 month ago  Onset quality: gradual  Frequency: rarely  Episode duration: 1 Hours  Progression since onset: improving  Pain location: RUQ  Pain - numeric: 2/10  Pain quality: aching  Radiates to: does not radiate  anorexia: No  arthralgias: No  belching: No  constipation: No  diarrhea: Yes  dysuria: No  fever: No  flatus: Yes  frequency: No  hematochezia: No  hematuria: No  melena: No  myalgias: Yes  nausea: Yes  weight loss: No  vomiting: No  Aggravated by: bowel movement  Relieved by: being still  Diagnostic workup: ultrasound    History of Present Illness: Aneudy Mckeon is a 38 y.o. male who presents for abdominal pain and GERD.     Abdominal pain resolved    Still with breakthrough gerd       Patient Active Problem List   Diagnosis    Palpitations    Environmental and seasonal allergies    GERD (gastroesophageal reflux disease)    Acute gallstone pancreatitis    Grade I diastolic dysfunction    Elevated BP without diagnosis of hypertension    Acute pancreatitis    Other idiopathic scoliosis, thoracolumbar region    Cyst of skin    Irritable bowel syndrome with diarrhea    Bile salt-induced diarrhea         Current Outpatient Medications:     colestipol (COLESTID) 1 g tablet, Take 1 tablet by mouth 2 (Two) Times a Day., Disp: 120 tablet, Rfl: 2    cyclobenzaprine (FLEXERIL) 10 MG tablet, TAKE 1 TABLET BY MOUTH TWICE DAILY AS NEEDED FOR MUSCLE SPASMS, Disp: 60 tablet, Rfl: 2    dicyclomine (BENTYL) 10 MG capsule, Take 1 capsule by mouth 4 (Four) Times a Day As Needed for Abdominal Cramping., Disp:  "30 capsule, Rfl: 1    fluticasone (FLONASE) 50 MCG/ACT nasal spray, 2 sprays into the nostril(s) as directed by provider Daily. Otc, Disp: , Rfl:     Multiple Vitamin (MULTI-VITAMIN DAILY PO), Take  by mouth. Not since before surgery, Disp: , Rfl:     Omega-3 Fatty Acids (fish oil) 1000 MG capsule capsule, Take 1 capsule by mouth Daily With Breakfast., Disp: , Rfl:     Probiotic Product (Risaquad-2) capsule capsule, Take 1 capsule by mouth Daily. [facility] Flasma 4 in 1 probiotic., Disp: , Rfl:     omeprazole (priLOSEC) 20 MG capsule, Take 1 capsule by mouth 2 (Two) Times a Day., Disp: 60 capsule, Rfl: 5       Objective     Physical Exam:   Vital Signs:   /82 (BP Location: Right arm, Patient Position: Sitting, Cuff Size: Adult)   Pulse 72   Temp 98.7 °F (37.1 °C) (Temporal)   Resp 20   Ht 177.8 cm (70\")   Wt 93.2 kg (205 lb 6.4 oz)   BMI 29.47 kg/m²      Physical Exam  Constitutional:       General: He is not in acute distress.     Appearance: He is not ill-appearing.   Cardiovascular:      Rate and Rhythm: Normal rate and regular rhythm.   Pulmonary:      Effort: Pulmonary effort is normal.      Breath sounds: Normal breath sounds.   Neurological:      Mental Status: He is alert.   Psychiatric:         Thought Content: Thought content normal.   No abd tenderness or mass          Assessment / Plan      Assessment/Plan:   Diagnoses and all orders for this visit:    1. Gastroesophageal reflux disease without esophagitis (Primary)  Assessment & Plan:  Taking pepcid BID and omeprazole OTC   Switch to omeprazole 20 mg BID for 1 month then back to qday. Trial off pepcid.   Consider h pylori in future. Declines today.     Orders:  -     omeprazole (priLOSEC) 20 MG capsule; Take 1 capsule by mouth 2 (Two) Times a Day.  Dispense: 60 capsule; Refill: 5    2. Bile salt-induced diarrhea  -     colestipol (COLESTID) 1 g tablet; Take 1 tablet by mouth 2 (Two) Times a Day.  Dispense: 120 tablet; Refill: 2    3. " Irritable bowel syndrome with diarrhea  Assessment & Plan:  Improved on colestipol  Low fodmap   Bentyl         4. Diarrhea, unspecified type  -     colestipol (COLESTID) 1 g tablet; Take 1 tablet by mouth 2 (Two) Times a Day.  Dispense: 120 tablet; Refill: 2           Follow Up:   No follow-ups on file.      MDM:     Toñito Lorenzo MD  Family Medicine - [facility]

## 2024-03-04 DIAGNOSIS — R10.11 RUQ PAIN: ICD-10-CM

## 2024-03-05 RX ORDER — DICYCLOMINE HYDROCHLORIDE 10 MG/1
10 CAPSULE ORAL 4 TIMES DAILY PRN
Qty: 30 CAPSULE | Refills: 1 | Status: SHIPPED | OUTPATIENT
Start: 2024-03-05

## 2024-04-11 DIAGNOSIS — M54.16 LUMBAR RADICULOPATHY: ICD-10-CM

## 2024-04-11 RX ORDER — CYCLOBENZAPRINE HCL 10 MG
10 TABLET ORAL 2 TIMES DAILY PRN
Qty: 60 TABLET | Refills: 2 | Status: SHIPPED | OUTPATIENT
Start: 2024-04-11

## 2024-05-10 DIAGNOSIS — K90.89 BILE SALT-INDUCED DIARRHEA: ICD-10-CM

## 2024-05-10 DIAGNOSIS — R19.7 DIARRHEA, UNSPECIFIED TYPE: ICD-10-CM

## 2024-05-10 DIAGNOSIS — K21.9 GASTROESOPHAGEAL REFLUX DISEASE WITHOUT ESOPHAGITIS: ICD-10-CM

## 2024-05-10 RX ORDER — MONTELUKAST SODIUM 4 MG/1
TABLET, CHEWABLE ORAL
Qty: 180 TABLET | Refills: 3 | Status: SHIPPED | OUTPATIENT
Start: 2024-05-10

## 2024-05-10 RX ORDER — OMEPRAZOLE 20 MG/1
20 CAPSULE, DELAYED RELEASE ORAL 2 TIMES DAILY
Qty: 60 CAPSULE | Refills: 5 | Status: SHIPPED | OUTPATIENT
Start: 2024-05-10

## 2024-05-14 ENCOUNTER — OFFICE VISIT (OUTPATIENT)
Dept: FAMILY MEDICINE CLINIC | Facility: CLINIC | Age: 39
End: 2024-05-14
Payer: COMMERCIAL

## 2024-05-14 ENCOUNTER — LAB (OUTPATIENT)
Dept: LAB | Facility: HOSPITAL | Age: 39
End: 2024-05-14
Payer: COMMERCIAL

## 2024-05-14 VITALS
RESPIRATION RATE: 20 BRPM | WEIGHT: 204.2 LBS | OXYGEN SATURATION: 98 % | HEIGHT: 70 IN | BODY MASS INDEX: 29.23 KG/M2 | SYSTOLIC BLOOD PRESSURE: 136 MMHG | TEMPERATURE: 98.6 F | HEART RATE: 85 BPM | DIASTOLIC BLOOD PRESSURE: 82 MMHG

## 2024-05-14 DIAGNOSIS — R03.0 ELEVATED BP WITHOUT DIAGNOSIS OF HYPERTENSION: ICD-10-CM

## 2024-05-14 DIAGNOSIS — Z00.00 ENCOUNTER FOR ROUTINE ADULT HEALTH EXAMINATION WITHOUT ABNORMAL FINDINGS: ICD-10-CM

## 2024-05-14 DIAGNOSIS — K58.0 IRRITABLE BOWEL SYNDROME WITH DIARRHEA: ICD-10-CM

## 2024-05-14 LAB
ALBUMIN SERPL-MCNC: 4.7 G/DL (ref 3.5–5.2)
ALBUMIN/GLOB SERPL: 2 G/DL
ALP SERPL-CCNC: 70 U/L (ref 39–117)
ALT SERPL W P-5'-P-CCNC: 46 U/L (ref 1–41)
ANION GAP SERPL CALCULATED.3IONS-SCNC: 10.7 MMOL/L (ref 5–15)
AST SERPL-CCNC: 23 U/L (ref 1–40)
BILIRUB SERPL-MCNC: 0.4 MG/DL (ref 0–1.2)
BUN SERPL-MCNC: 14 MG/DL (ref 6–20)
BUN/CREAT SERPL: 14.6 (ref 7–25)
CALCIUM SPEC-SCNC: 9.8 MG/DL (ref 8.6–10.5)
CHLORIDE SERPL-SCNC: 102 MMOL/L (ref 98–107)
CHOLEST SERPL-MCNC: 168 MG/DL (ref 0–200)
CO2 SERPL-SCNC: 25.3 MMOL/L (ref 22–29)
CREAT SERPL-MCNC: 0.96 MG/DL (ref 0.76–1.27)
DEPRECATED RDW RBC AUTO: 41.7 FL (ref 37–54)
EGFRCR SERPLBLD CKD-EPI 2021: 104.4 ML/MIN/1.73
ERYTHROCYTE [DISTWIDTH] IN BLOOD BY AUTOMATED COUNT: 12.9 % (ref 12.3–15.4)
GLOBULIN UR ELPH-MCNC: 2.3 GM/DL
GLUCOSE SERPL-MCNC: 97 MG/DL (ref 65–99)
HBA1C MFR BLD: 5 % (ref 4.8–5.6)
HCT VFR BLD AUTO: 45.7 % (ref 37.5–51)
HDLC SERPL-MCNC: 37 MG/DL (ref 40–60)
HGB BLD-MCNC: 15.3 G/DL (ref 13–17.7)
LDLC SERPL CALC-MCNC: 77 MG/DL (ref 0–100)
LDLC/HDLC SERPL: 1.72 {RATIO}
MCH RBC QN AUTO: 30.2 PG (ref 26.6–33)
MCHC RBC AUTO-ENTMCNC: 33.5 G/DL (ref 31.5–35.7)
MCV RBC AUTO: 90.3 FL (ref 79–97)
PLATELET # BLD AUTO: 209 10*3/MM3 (ref 140–450)
PMV BLD AUTO: 10.5 FL (ref 6–12)
POTASSIUM SERPL-SCNC: 3.9 MMOL/L (ref 3.5–5.2)
PROT SERPL-MCNC: 7 G/DL (ref 6–8.5)
RBC # BLD AUTO: 5.06 10*6/MM3 (ref 4.14–5.8)
SODIUM SERPL-SCNC: 138 MMOL/L (ref 136–145)
TRIGL SERPL-MCNC: 336 MG/DL (ref 0–150)
VLDLC SERPL-MCNC: 54 MG/DL (ref 5–40)
WBC NRBC COR # BLD AUTO: 7.05 10*3/MM3 (ref 3.4–10.8)

## 2024-05-14 PROCEDURE — 80061 LIPID PANEL: CPT

## 2024-05-14 PROCEDURE — 85027 COMPLETE CBC AUTOMATED: CPT

## 2024-05-14 PROCEDURE — 83036 HEMOGLOBIN GLYCOSYLATED A1C: CPT

## 2024-05-14 PROCEDURE — 99395 PREV VISIT EST AGE 18-39: CPT | Performed by: STUDENT IN AN ORGANIZED HEALTH CARE EDUCATION/TRAINING PROGRAM

## 2024-05-14 PROCEDURE — 80053 COMPREHEN METABOLIC PANEL: CPT

## 2024-05-14 NOTE — PROGRESS NOTES
Male Physical Note      Patient Name: Aneudy Mckeon  : 1985   MRN: [id number]     Subjective    Subjective     Chief Complaint:    Chief Complaint   Patient presents with    Annual Exam     Annual exam       History of Present Illness: Aneudy Mckeon is a 39 y.o. male who is here today for their annual health maintenance and physical.      Past Medical History, Social History, Family History and Care Team were all reviewed with patient and updated as appropriate.     Medications:     Current Outpatient Medications:     colestipol (COLESTID) 1 g tablet, Take 1 tablet twice daily as needed for loose stools, Disp: 180 tablet, Rfl: 3    cyclobenzaprine (FLEXERIL) 10 MG tablet, Take 1 tablet by mouth 2 (Two) Times a Day As Needed for Muscle Spasms., Disp: 60 tablet, Rfl: 2    dicyclomine (BENTYL) 10 MG capsule, Take 1 capsule by mouth 4 (Four) Times a Day As Needed for Abdominal Cramping., Disp: 30 capsule, Rfl: 1    fluticasone (FLONASE) 50 MCG/ACT nasal spray, 2 sprays into the nostril(s) as directed by provider Daily. Otc, Disp: , Rfl:     Multiple Vitamin (MULTI-VITAMIN DAILY PO), Take  by mouth. Not since before surgery, Disp: , Rfl:     Omega-3 Fatty Acids (fish oil) 1000 MG capsule capsule, Take 1 capsule by mouth Daily With Breakfast., Disp: , Rfl:     omeprazole (priLOSEC) 20 MG capsule, Take 1 capsule by mouth 2 (Two) Times a Day., Disp: 60 capsule, Rfl: 5    Probiotic Product (Risaquad-2) capsule capsule, Take 1 capsule by mouth Daily. [facility] 4 in 1 probiotic., Disp: , Rfl:     Allergies:   Allergies   Allergen Reactions    Benadryl [Diphenhydramine] Rash    Other Rash       Immunizations:  Td/Tdap(Booster Q 10 yrs):  utd     Diet/Physical activity: discussed     Depression: PHQ-2 Depression Screening  Little interest or pleasure in doing things? 0-->not at all   Feeling down, depressed, or hopeless? 0-->not at all   PHQ-2 Total Score 0         Objective   Objective  "    Physical Exam:  Vital Signs:   Vitals:    05/14/24 1041 05/14/24 1108   BP: 140/80 136/82   BP Location: Left arm    Patient Position: Sitting    Cuff Size: Adult    Pulse: 85    Resp: 20    Temp: 98.6 °F (37 °C)    TempSrc: Temporal    SpO2: 98%    Weight: 92.6 kg (204 lb 3.2 oz)    Height: 177.8 cm (70\")    PainSc: 0-No pain      Body mass index is 29.3 kg/m².     Physical Exam  Constitutional:       General: He is not in acute distress.     Appearance: He is not ill-appearing.   Cardiovascular:      Rate and Rhythm: Normal rate and regular rhythm.   Pulmonary:      Effort: Pulmonary effort is normal.      Breath sounds: Normal breath sounds.   Neurological:      Mental Status: He is alert.   Psychiatric:         Thought Content: Thought content normal.     Abd soft non-tender     Procedures    Assessment / Plan      Assessment/Plan:   Diagnoses and all orders for this visit:    1. Encounter for routine adult health examination without abnormal findings  -     CBC (No Diff); Future  -     Comprehensive Metabolic Panel; Future  -     Lipid Panel; Future  -     Hemoglobin A1c; Future    2. Irritable bowel syndrome with diarrhea  Assessment & Plan:  Improved with colestipol       3. Elevated BP without diagnosis of hypertension  Assessment & Plan:  Monitor   Low salt   Weight loss              Follow Up:   Return in about 6 months (around 11/14/2024) for Follow-up, BP, IBS .    Healthcare Maintenance:   Health maintenance counseling included discussion of healthy diet and physical activity, Dental hygiene, and vaccinations.  Aneudy Mckeon voices understanding and acceptance of this advice and will call back with any further questions or concerns. AVS with preventive healthcare tips printed for patient.     Toñito Lorenzo MD  [place] - [facility] [place]    "

## 2024-06-03 DIAGNOSIS — R10.11 RUQ PAIN: ICD-10-CM

## 2024-06-04 RX ORDER — DICYCLOMINE HYDROCHLORIDE 10 MG/1
10 CAPSULE ORAL 4 TIMES DAILY PRN
Qty: 30 CAPSULE | Refills: 1 | Status: SHIPPED | OUTPATIENT
Start: 2024-06-04 | End: 2024-06-07 | Stop reason: SDUPTHER

## 2024-06-07 DIAGNOSIS — K90.89 BILE SALT-INDUCED DIARRHEA: ICD-10-CM

## 2024-06-07 DIAGNOSIS — M54.16 LUMBAR RADICULOPATHY: ICD-10-CM

## 2024-06-07 DIAGNOSIS — R19.7 DIARRHEA, UNSPECIFIED TYPE: ICD-10-CM

## 2024-06-07 DIAGNOSIS — R10.11 RUQ PAIN: ICD-10-CM

## 2024-06-07 RX ORDER — MONTELUKAST SODIUM 4 MG/1
TABLET, CHEWABLE ORAL
Qty: 180 TABLET | Refills: 3 | Status: SHIPPED | OUTPATIENT
Start: 2024-06-07

## 2024-06-07 RX ORDER — CYCLOBENZAPRINE HCL 10 MG
10 TABLET ORAL 2 TIMES DAILY PRN
Qty: 60 TABLET | Refills: 2 | Status: SHIPPED | OUTPATIENT
Start: 2024-06-07

## 2024-06-07 RX ORDER — DICYCLOMINE HYDROCHLORIDE 10 MG/1
10 CAPSULE ORAL 4 TIMES DAILY PRN
Qty: 30 CAPSULE | Refills: 1 | Status: SHIPPED | OUTPATIENT
Start: 2024-06-07

## 2024-06-07 NOTE — TELEPHONE ENCOUNTER
Caller: Aneudy Mckeonony    Relationship: Self    Best call back number: [phone]     Requested Prescriptions:   Requested Prescriptions     Pending Prescriptions Disp Refills    dicyclomine (BENTYL) 10 MG capsule 30 capsule 1     Sig: Take 1 capsule by mouth 4 (Four) Times a Day As Needed for Abdominal Cramping.        Pharmacy where request should be sent: [address] [phone] [facility] [phone]      Last office visit with prescribing clinician: 5/14/2024   Last telemedicine visit with prescribing clinician: Visit date not found   Next office visit with prescribing clinician: 11/5/2024     Additional details provided by patient: NO MEDICATION ON HAND.    PATIENT IS NEEDING TO HAVE THE SCRIPT SENT TO THIS PHARMACY AS THE [facility]  ARE TEMPERAILY CLOSED      Does the patient have less than a 3 day supply:  [x] Yes  [] No    Would you like a call back once the refill request has been completed: [x] Yes [] No    If the office needs to give you a call back, can they leave a voicemail: [] Yes [] No    Kristi Serrano Rep   06/07/24 10:51 EDT

## 2024-06-07 NOTE — TELEPHONE ENCOUNTER
Caller: Aneudy Mckeon    Relationship: Self    Best call back number: [phone]     Requested Prescriptions:   Requested Prescriptions     Pending Prescriptions Disp Refills    dicyclomine (BENTYL) 10 MG capsule 30 capsule 1     Sig: Take 1 capsule by mouth 4 (Four) Times a Day As Needed for Abdominal Cramping.    cyclobenzaprine (FLEXERIL) 10 MG tablet 60 tablet 2     Sig: Take 1 tablet by mouth 2 (Two) Times a Day As Needed for Muscle Spasms.    colestipol (COLESTID) 1 g tablet 180 tablet 3     Sig: Take 1 tablet twice daily as needed for loose stools        Pharmacy where request should be sent: [place] [address] [facility] [phone] [facility] [phone]      Last office visit with prescribing clinician: 5/14/2024   Last telemedicine visit with prescribing clinician: Visit date not found   Next office visit with prescribing clinician: 11/5/2024     Additional details provided by patient: PATIENT WILL BE LEAVING [facility] NEXT WEEK    Does the patient have less than a 3 day supply:  [x] Yes  [] No    Would you like a call back once the refill request has been completed: [] Yes [x] No    If the office needs to give you a call back, can they leave a voicemail: [] Yes [x] No    Kristi Bailey   06/07/24 11:02 EDT         DELETE AFTER READING TO PATIENT: “Thank you for sharing this information with me. I will send a message to the clinical team. Please allow 48 hours for the clinical staff to follow up on this request.”

## 2024-07-15 DIAGNOSIS — Z91.89 AT RISK FOR OBSTRUCTIVE SLEEP APNEA: Primary | ICD-10-CM

## 2024-09-30 ENCOUNTER — LAB (OUTPATIENT)
Dept: LAB | Facility: HOSPITAL | Age: 39
End: 2024-09-30
Payer: MEDICAID

## 2024-09-30 ENCOUNTER — OFFICE VISIT (OUTPATIENT)
Dept: FAMILY MEDICINE CLINIC | Facility: CLINIC | Age: 39
End: 2024-09-30
Payer: MEDICAID

## 2024-09-30 VITALS
HEART RATE: 75 BPM | DIASTOLIC BLOOD PRESSURE: 88 MMHG | BODY MASS INDEX: 29.72 KG/M2 | HEIGHT: 70 IN | WEIGHT: 207.6 LBS | OXYGEN SATURATION: 98 % | RESPIRATION RATE: 20 BRPM | SYSTOLIC BLOOD PRESSURE: 140 MMHG | TEMPERATURE: 98.4 F

## 2024-09-30 DIAGNOSIS — K21.9 GASTROESOPHAGEAL REFLUX DISEASE WITHOUT ESOPHAGITIS: ICD-10-CM

## 2024-09-30 DIAGNOSIS — R07.9 CHEST PAIN, UNSPECIFIED TYPE: ICD-10-CM

## 2024-09-30 DIAGNOSIS — R03.0 ELEVATED BP WITHOUT DIAGNOSIS OF HYPERTENSION: ICD-10-CM

## 2024-09-30 DIAGNOSIS — R00.2 PALPITATIONS: Primary | ICD-10-CM

## 2024-09-30 DIAGNOSIS — I51.89 GRADE I DIASTOLIC DYSFUNCTION: ICD-10-CM

## 2024-09-30 LAB
BASOPHILS # BLD AUTO: 0.06 10*3/MM3 (ref 0–0.2)
BASOPHILS NFR BLD AUTO: 1.1 % (ref 0–1.5)
DEPRECATED RDW RBC AUTO: 41.5 FL (ref 37–54)
EOSINOPHIL # BLD AUTO: 0.08 10*3/MM3 (ref 0–0.4)
EOSINOPHIL NFR BLD AUTO: 1.4 % (ref 0.3–6.2)
ERYTHROCYTE [DISTWIDTH] IN BLOOD BY AUTOMATED COUNT: 12.4 % (ref 12.3–15.4)
HCT VFR BLD AUTO: 46.7 % (ref 37.5–51)
HGB BLD-MCNC: 15.9 G/DL (ref 13–17.7)
IMM GRANULOCYTES # BLD AUTO: 0.04 10*3/MM3 (ref 0–0.05)
IMM GRANULOCYTES NFR BLD AUTO: 0.7 % (ref 0–0.5)
LYMPHOCYTES # BLD AUTO: 1.48 10*3/MM3 (ref 0.7–3.1)
LYMPHOCYTES NFR BLD AUTO: 26.2 % (ref 19.6–45.3)
MCH RBC QN AUTO: 31.1 PG (ref 26.6–33)
MCHC RBC AUTO-ENTMCNC: 34 G/DL (ref 31.5–35.7)
MCV RBC AUTO: 91.2 FL (ref 79–97)
MONOCYTES # BLD AUTO: 0.55 10*3/MM3 (ref 0.1–0.9)
MONOCYTES NFR BLD AUTO: 9.8 % (ref 5–12)
NEUTROPHILS NFR BLD AUTO: 3.43 10*3/MM3 (ref 1.7–7)
NEUTROPHILS NFR BLD AUTO: 60.8 % (ref 42.7–76)
NRBC BLD AUTO-RTO: 0 /100 WBC (ref 0–0.2)
PLATELET # BLD AUTO: 217 10*3/MM3 (ref 140–450)
PMV BLD AUTO: 10.6 FL (ref 6–12)
RBC # BLD AUTO: 5.12 10*6/MM3 (ref 4.14–5.8)
WBC NRBC COR # BLD AUTO: 5.64 10*3/MM3 (ref 3.4–10.8)

## 2024-09-30 PROCEDURE — 36415 COLL VENOUS BLD VENIPUNCTURE: CPT | Performed by: PHYSICIAN ASSISTANT

## 2024-09-30 PROCEDURE — 80053 COMPREHEN METABOLIC PANEL: CPT | Performed by: PHYSICIAN ASSISTANT

## 2024-09-30 PROCEDURE — 84439 ASSAY OF FREE THYROXINE: CPT | Performed by: PHYSICIAN ASSISTANT

## 2024-09-30 PROCEDURE — 85025 COMPLETE CBC W/AUTO DIFF WBC: CPT | Performed by: PHYSICIAN ASSISTANT

## 2024-09-30 PROCEDURE — 83735 ASSAY OF MAGNESIUM: CPT | Performed by: PHYSICIAN ASSISTANT

## 2024-09-30 PROCEDURE — 84443 ASSAY THYROID STIM HORMONE: CPT | Performed by: PHYSICIAN ASSISTANT

## 2024-09-30 PROCEDURE — 84484 ASSAY OF TROPONIN QUANT: CPT | Performed by: PHYSICIAN ASSISTANT

## 2024-09-30 NOTE — ASSESSMENT & PLAN NOTE
Repeat blood pressure 132/82.  Patient very hesitant to start antihypertensive despite my recommendation to begin low-dose.  I have encouraged patient to monitor blood pressure closely at home.  Additional diet and lifestyle modifications discussed.

## 2024-09-30 NOTE — ASSESSMENT & PLAN NOTE
EKG in office today very reassuring.  Patient currently asymptomatic.  Strict ER precautions discussed extensively.  Discussed possible differential including but not limited to cardiovascular, GERD, increased stress and anxiety, electrolyte abnormality, etc.

## 2024-09-30 NOTE — PROGRESS NOTES
"     Follow Up Office Visit      Patient Name: Aneudy Mckeon  : 1985   MRN: [id number]     Chief Complaint:    Chief Complaint   Patient presents with    Heart Problem     Heart murmur, having pain in chest intermittently and also dizziness at times, has been having issues with sleep       History of Present Illness: Aneudy Mckeon is a 39 y.o. male who is here today with complaints of intermittent palpitations and chest pain.     Patient states over the last several weeks he has been experiencing increased palpitations.  He describes it as feeling as though his heart is skipping a beat.  He states he has had 1 episode with associated chest pain and mild dizziness.  He described the chest pain as a \"ripping sensation\"  He states the episode resolved relatively quickly on its own without intervention.  He denies any associated nausea, vomiting, shortness of breath, diaphoresis.  Patient states when he began experiencing chest pain it was while he was exerting himself working on musical instruments.    Patient states in  he had similar episodes.  At that time he was referred to cardiology and underwent a Holter monitor, stress test, and echo.  Stress test was negative.Echocardiogram revealed an ejection fraction of 61 to 65%, systolic function was normal, left ventricular grade 1 diastolic function noted.  No significant structural or functional valvular disease appreciated.  His heart monitor results revealed no concerning arrhythmias.    Of note, patient does state he has been under increased stress as of late due to his job.  He also has a longstanding history of GERD which he states is well-controlled at most times with omeprazole 20 mg twice daily.  He does state his most recent episode of palpitations which was associated with chest pain was following food intake.    States he has a family history of heart disease, stating his grandfather had a heart attack in his late 50s and is now 84 " with a pacemaker.  He states his parents are both diabetic and believes they have hypertension.    Subjective        I have reviewed and the following portions of the patient's history were updated as appropriate: past family history, past medical history, past social history, past surgical history and problem list.    Medications:     Current Outpatient Medications:     colestipol (COLESTID) 1 g tablet, Take 1 tablet twice daily as needed for loose stools, Disp: 180 tablet, Rfl: 3    cyclobenzaprine (FLEXERIL) 10 MG tablet, Take 1 tablet by mouth 2 (Two) Times a Day As Needed for Muscle Spasms., Disp: 60 tablet, Rfl: 2    dicyclomine (BENTYL) 10 MG capsule, Take 1 capsule by mouth 4 (Four) Times a Day As Needed for Abdominal Cramping., Disp: 30 capsule, Rfl: 1    fluticasone (FLONASE) 50 MCG/ACT nasal spray, Administer 2 sprays into the nostril(s) as directed by provider Daily. Otc, Disp: , Rfl:     Multiple Vitamin (MULTI-VITAMIN DAILY PO), Take  by mouth. Not since before surgery, Disp: , Rfl:     Omega-3 Fatty Acids (fish oil) 1000 MG capsule capsule, Take 1 capsule by mouth Daily With Breakfast., Disp: , Rfl:     omeprazole (priLOSEC) 20 MG capsule, Take 1 capsule by mouth 2 (Two) Times a Day., Disp: 60 capsule, Rfl: 5    Probiotic Product (Risaquad-2) capsule capsule, Take 1 capsule by mouth Daily. [place] [employer] 4 in 1 probiotic., Disp: , Rfl:     Allergies:   Allergies   Allergen Reactions    Benadryl [Diphenhydramine] Rash    Other Rash       Objective     Physical Exam:   Physical Exam  Constitutional:       General: He is not in acute distress.     Appearance: He is overweight. He is not ill-appearing.   HENT:      Head: Normocephalic.   Neck:      Vascular: No carotid bruit.   Cardiovascular:      Rate and Rhythm: Normal rate and regular rhythm.      Heart sounds: No murmur heard.  Pulmonary:      Effort: Pulmonary effort is normal. No respiratory distress.      Breath sounds: Normal breath  "sounds.   Musculoskeletal:         General: Normal range of motion.   Skin:     General: Skin is warm.   Neurological:      General: No focal deficit present.      Mental Status: He is alert.   Psychiatric:         Mood and Affect: Mood normal.         Vital Signs:   Vitals:    09/30/24 1129   BP: 140/88   BP Location: Left arm   Patient Position: Sitting   Cuff Size: Adult   Pulse: 75   Resp: 20   Temp: 98.4 °F (36.9 °C)   TempSrc: Temporal   SpO2: 98%   Weight: 94.2 kg (207 lb 9.6 oz)   Height: 177.8 cm (70\")     Body mass index is 29.79 kg/m².      ECG 12 Lead    Date/Time: 9/30/2024 2:16 PM  Performed by: Lula Rosales PA-C    Authorized by: Lula Rosales PA-C  Comparison: compared with previous ECG   Similar to previous ECG  Rhythm: sinus rhythm  Rate: normal  Conduction: conduction normal  ST Segments: ST segments normal  T Waves: T waves normal  QRS axis: normal  Other: no other findings    Clinical impression: normal ECG          Assessment / Plan         Assessment and Plan     Diagnoses and all orders for this visit:    1. Palpitations (Primary)  Assessment & Plan:  Previous workup very reassuring.  Lengthy discussion regarding possible differential.  Will refer to cardiology for reevaluation.  Discussed my suspicion symptoms could be secondary to GERD and/or increased stress    Orders:  -     Comprehensive Metabolic Panel  -     CBC & Differential  -     Magnesium  -     High Sensitivity Troponin T  -     TSH  -     T4, Free  -     ECG 12 Lead  -     Ambulatory Referral to Cardiology    2. Chest pain, unspecified type  Assessment & Plan:  EKG in office today very reassuring.  Patient currently asymptomatic.  Strict ER precautions discussed extensively.  Discussed possible differential including but not limited to cardiovascular, GERD, increased stress and anxiety, electrolyte abnormality, etc.    Orders:  -     Comprehensive Metabolic Panel  -     CBC & Differential  -     Magnesium  -     High " Sensitivity Troponin T  -     XR Chest PA & Lateral; Future  -     ECG 12 Lead  -     Ambulatory Referral to Cardiology    3. Elevated BP without diagnosis of hypertension  Assessment & Plan:  Repeat blood pressure 132/82.  Patient very hesitant to start antihypertensive despite my recommendation to begin low-dose.  I have encouraged patient to monitor blood pressure closely at home.  Additional diet and lifestyle modifications discussed.    Orders:  -     Ambulatory Referral to Cardiology    4. Grade I diastolic dysfunction  Assessment & Plan:  Per echocardiogram on 2/21/2020      Orders:  -     Ambulatory Referral to Cardiology    5. Gastroesophageal reflux disease without esophagitis  Assessment & Plan:  Discussed my concern symptoms could be secondary to GERD  We will refer to cardiology for additional workup, though discussed if workup is normal he may benefit from referral to GI             Follow Up:   Return in about 4 weeks (around 10/28/2024) for chest pain, BP, GERD.    Lula Rosales PA-C    [facility] [place]

## 2024-09-30 NOTE — ASSESSMENT & PLAN NOTE
Discussed my concern symptoms could be secondary to GERD  We will refer to cardiology for additional workup, though discussed if workup is normal he may benefit from referral to GI

## 2024-09-30 NOTE — ASSESSMENT & PLAN NOTE
Previous workup very reassuring.  Lengthy discussion regarding possible differential.  Will refer to cardiology for reevaluation.  Discussed my suspicion symptoms could be secondary to GERD and/or increased stress

## 2024-10-01 LAB
ALBUMIN SERPL-MCNC: 5.1 G/DL (ref 3.5–5.2)
ALBUMIN/GLOB SERPL: 2.2 G/DL
ALP SERPL-CCNC: 69 U/L (ref 39–117)
ALT SERPL W P-5'-P-CCNC: 46 U/L (ref 1–41)
ANION GAP SERPL CALCULATED.3IONS-SCNC: 10.3 MMOL/L (ref 5–15)
AST SERPL-CCNC: 25 U/L (ref 1–40)
BILIRUB SERPL-MCNC: 0.3 MG/DL (ref 0–1.2)
BUN SERPL-MCNC: 16 MG/DL (ref 6–20)
BUN/CREAT SERPL: 12.6 (ref 7–25)
CALCIUM SPEC-SCNC: 10.1 MG/DL (ref 8.6–10.5)
CHLORIDE SERPL-SCNC: 98 MMOL/L (ref 98–107)
CO2 SERPL-SCNC: 27.7 MMOL/L (ref 22–29)
CREAT SERPL-MCNC: 1.27 MG/DL (ref 0.76–1.27)
EGFRCR SERPLBLD CKD-EPI 2021: 74.6 ML/MIN/1.73
GLOBULIN UR ELPH-MCNC: 2.3 GM/DL
GLUCOSE SERPL-MCNC: 89 MG/DL (ref 65–99)
MAGNESIUM SERPL-MCNC: 2.3 MG/DL (ref 1.6–2.6)
POTASSIUM SERPL-SCNC: 4.4 MMOL/L (ref 3.5–5.2)
PROT SERPL-MCNC: 7.4 G/DL (ref 6–8.5)
SODIUM SERPL-SCNC: 136 MMOL/L (ref 136–145)
T4 FREE SERPL-MCNC: 1.27 NG/DL (ref 0.92–1.68)
TROPONIN T SERPL HS-MCNC: 6 NG/L
TSH SERPL DL<=0.05 MIU/L-ACNC: 2.16 UIU/ML (ref 0.27–4.2)

## 2024-10-02 ENCOUNTER — HOSPITAL ENCOUNTER (OUTPATIENT)
Dept: GENERAL RADIOLOGY | Facility: HOSPITAL | Age: 39
Discharge: HOME | End: 2024-10-02
Admitting: PHYSICIAN ASSISTANT
Payer: MEDICAID

## 2024-10-02 ENCOUNTER — TELEPHONE (OUTPATIENT)
Dept: FAMILY MEDICINE CLINIC | Facility: CLINIC | Age: 39
End: 2024-10-02
Payer: MEDICAID

## 2024-10-02 DIAGNOSIS — R07.9 CHEST PAIN, UNSPECIFIED TYPE: ICD-10-CM

## 2024-10-02 PROCEDURE — 71046 X-RAY EXAM CHEST 2 VIEWS: CPT

## 2024-10-02 NOTE — TELEPHONE ENCOUNTER
Please let patient know his labs look great and are very reassuring.  They are largely unchanged and stable in comparison to previous.  The cardiac enzyme that we checked for possible ischemia (troponin) is normal.   His electrolytes, kidney, and liver function are normal.  Blood counts are normal.  Thyroid function is normal.    We will continue with current treatment plan and he should follow-up as scheduled    Called patient and gave him the above message. He voiced understanding.

## 2024-10-09 NOTE — PROGRESS NOTES
[facility]  INITIAL OFFICE CONSULT      Aneudy Mckeon  1985  PCP: Toñito Lorenzo MD    SUBJECTIVE:   Aneudy Mckeon is a 39 y.o. 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  [facility] he ended a workup for some palpitations.  This included a Zio monitor echocardiogram.  He had occasional PACs echogram stable except for mild diastolic dysfunction.  Recently he has noted his blood pressure been somewhat elevated in 130s 140s systolic number.  He tells me his parents both have high blood pressure and had early onset.  In addition is what prompted the visit today as 2 weeks ago he had some symptoms of palpitations after he was working on an instrument he got some sharp stabbing knifelike pains lasted several minutes resolved on own.  This went on for 3 to 4 days.  The past 2 weeks seems to been quite he has not any recurrent symptoms.  He does exercise walking with his wife in the neighborhood but no cardio per se.  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 He has not had any significant chest pain while doing these activities.      Cardiac PMH: (Old records have been reviewed and summarized below)  Palpitations  Holter Monitor 2020, breif AT, pacs  Echocardiogram Normal EF diastolic dysfunction grade I   Atypical chest pain  Early HTN  HLD: Total, Trig 336, HDL 37, LDL 77  Severe scoliosis  GERD      Past Medical History, Past Surgical History, Family history, Social History, and Medications were all reviewed with the  patient today and updated as necessary.     Current Outpatient Medications   Medication Sig Dispense Refill    colestipol (COLESTID) 1 g tablet Take 1 tablet twice daily as needed for loose stools 180 tablet 3    cyclobenzaprine (FLEXERIL) 10 MG tablet Take 1 tablet by mouth 2 (Two) Times a Day As Needed for Muscle Spasms. 60 tablet 2    dicyclomine (BENTYL) 10 MG capsule Take 1 capsule by mouth 4 (Four) Times a Day As Needed for Abdominal Cramping. 30 capsule 1    fluticasone (FLONASE) 50 MCG/ACT nasal spray Administer 2 sprays into the nostril(s) as directed by provider Daily. Otc      Multiple Vitamin (MULTI-VITAMIN DAILY PO) Take  by mouth. Not since before surgery      Omega-3 Fatty Acids (fish oil) 1000 MG capsule capsule Take 1 capsule by mouth Daily With Breakfast.      omeprazole (priLOSEC) 20 MG capsule Take 1 capsule by mouth 2 (Two) Times a Day. 60 capsule 5    Probiotic Product (Risaquad-2) capsule capsule Take 1 capsule by mouth Daily. [place] 4 in 1 probiotic.       No current facility-administered medications for this visit.     Allergies   Allergen Reactions    Benadryl [Diphenhydramine] Rash    Other Rash         Past Medical History:   Diagnosis Date    Allergic     Cholelithiasis     GERD (gastroesophageal reflux disease)     Grade I diastolic dysfunction 10/06/2021    Irritable bowel syndrome 05/2023    Worse past 6 months    Low back pain     Pancreatitis     along with gallbladder issues     Scoliosis      Past Surgical History:   Procedure Laterality Date    CHOLECYSTECTOMY      10/14/21    CHOLECYSTECTOMY WITH INTRAOPERATIVE CHOLANGIOGRAM N/A 10/07/2021    Procedure: CHOLECYSTECTOMY LAPAROSCOPIC INTRAOPERATIVE CHOLANGIOGRAM;  Surgeon: Sonny Mariscal MD;  Location: [facility];  Service: General;  Laterality: N/A;    DENTAL PROCEDURE      tooth removed    EYE SURGERY      Lasik     Family History   Problem Relation Age of Onset    Diabetes type II Mother     Anxiety disorder  "Mother     Diabetes Mother     Diabetes type II Father     Diabetes Father     No Known Problems Brother     Colon cancer Paternal Grandmother 71    Diabetes type II Paternal Grandfather     Coronary artery disease Paternal Grandfather      Social History     Tobacco Use    Smoking status: Never    Smokeless tobacco: Never   Substance Use Topics    Alcohol use: No       ROS:  Review of Symptoms:  General: no recent weight loss/gain, weakness or fatigue  Skin: no rashes, lumps, or other skin changes  HEENT: no dizziness, lightheadedness, or vision changes  Respiratory: no cough or hemoptysis  Cardiovascular: +palpitations, and tachycardia  Gastrointestinal: no black/tarry stools or diarrhea  Urinary: no change in frequency or urgency  Peripheral Vascular: no claudication or leg cramps  Musculoskeletal: no muscle or joint pain/stiffness  Psychiatric: no depression or excessive stress  Neurological: no sensory or motor loss, no syncope  Hematologic: no anemia, easy bruising or bleeding  Endocrine: no thyroid problems, nor heat or cold intolerance         PHYSICAL EXAM:   /84 (BP Location: Right arm, Patient Position: Sitting)   Pulse 82   Ht 177.8 cm (70\")   Wt 93.4 kg (206 lb)   SpO2 98%   BMI 29.56 kg/m²      Wt Readings from Last 5 Encounters:   10/10/24 93.4 kg (206 lb)   09/30/24 94.2 kg (207 lb 9.6 oz)   05/14/24 92.6 kg (204 lb 3.2 oz)   01/05/24 93.2 kg (205 lb 6.4 oz)   11/15/23 93 kg (205 lb)     BP Readings from Last 5 Encounters:   10/10/24 136/84   09/30/24 140/88   05/14/24 136/82   01/05/24 134/82   11/15/23 140/78       General-Well Nourished, Well developed  Eyes - PERRLA  Neck- supple, No mass  CV- regular rate and rhythm, no MRG  Lung- clear bilaterally  Abd- soft, +BS  Musc/skel - Norm strength and range of motion  Skin- warm and dry  Neuro - Alert & Oriented x 3, appropriate mood.    Patient's external notes were reviewed.  Independent interpretation of test performed by another " physician in facility were reviewed.  Outside laboratory data was also reviewed.    Medical problems and test results were reviewed with the patient today.     Results for orders placed or performed in visit on 09/30/24   Comprehensive Metabolic Panel    Specimen: Blood   Result Value Ref Range    Glucose 89 65 - 99 mg/dL    BUN 16 6 - 20 mg/dL    Creatinine 1.27 0.76 - 1.27 mg/dL    Sodium 136 136 - 145 mmol/L    Potassium 4.4 3.5 - 5.2 mmol/L    Chloride 98 98 - 107 mmol/L    CO2 27.7 22.0 - 29.0 mmol/L    Calcium 10.1 8.6 - 10.5 mg/dL    Total Protein 7.4 6.0 - 8.5 g/dL    Albumin 5.1 3.5 - 5.2 g/dL    ALT (SGPT) 46 (H) 1 - 41 U/L    AST (SGOT) 25 1 - 40 U/L    Alkaline Phosphatase 69 39 - 117 U/L    Total Bilirubin 0.3 0.0 - 1.2 mg/dL    Globulin 2.3 gm/dL    A/G Ratio 2.2 g/dL    BUN/Creatinine Ratio 12.6 7.0 - 25.0    Anion Gap 10.3 5.0 - 15.0 mmol/L    eGFR 74.6 >60.0 mL/min/1.73   Magnesium    Specimen: Blood   Result Value Ref Range    Magnesium 2.3 1.6 - 2.6 mg/dL   High Sensitivity Troponin T    Specimen: Blood   Result Value Ref Range    HS Troponin T 6 <22 ng/L   TSH    Specimen: Blood   Result Value Ref Range    TSH 2.160 0.270 - 4.200 uIU/mL   T4, Free    Specimen: Blood   Result Value Ref Range    Free T4 1.27 0.92 - 1.68 ng/dL   CBC Auto Differential    Specimen: Blood   Result Value Ref Range    WBC 5.64 3.40 - 10.80 10*3/mm3    RBC 5.12 4.14 - 5.80 10*6/mm3    Hemoglobin 15.9 13.0 - 17.7 g/dL    Hematocrit 46.7 37.5 - 51.0 %    MCV 91.2 79.0 - 97.0 fL    MCH 31.1 26.6 - 33.0 pg    MCHC 34.0 31.5 - 35.7 g/dL    RDW 12.4 12.3 - 15.4 %    RDW-SD 41.5 37.0 - 54.0 fl    MPV 10.6 6.0 - 12.0 fL    Platelets 217 140 - 450 10*3/mm3    Neutrophil % 60.8 42.7 - 76.0 %    Lymphocyte % 26.2 19.6 - 45.3 %    Monocyte % 9.8 5.0 - 12.0 %    Eosinophil % 1.4 0.3 - 6.2 %    Basophil % 1.1 0.0 - 1.5 %    Immature Grans % 0.7 (H) 0.0 - 0.5 %    Neutrophils, Absolute 3.43 1.70 - 7.00 10*3/mm3    Lymphocytes, Absolute  1.48 0.70 - 3.10 10*3/mm3    Monocytes, Absolute 0.55 0.10 - 0.90 10*3/mm3    Eosinophils, Absolute 0.08 0.00 - 0.40 10*3/mm3    Basophils, Absolute 0.06 0.00 - 0.20 10*3/mm3    Immature Grans, Absolute 0.04 0.00 - 0.05 10*3/mm3    nRBC 0.0 0.0 - 0.2 /100 WBC         Lab Results   Component Value Date    CHOL 168 05/14/2024    HDL 37 (L) 05/14/2024    LDL 77 05/14/2024    VLDL 54 (H) 05/14/2024       EKG:  (EKG/Tracing has been independently visualized by me and summarized below)      ECG 12 Lead    Date/Time: 10/10/2024 10:05 AM  Performed by: Salazar Real PA    Authorized by: Salazar Real PA  Comparison: compared with previous ECG from 9/30/2024  Similar to previous ECG  Rhythm: sinus rhythm  Ectopy: atrial premature contractions  Rate: normal  Conduction: conduction normal  ST Segments: ST segments normal  QRS axis: normal    Clinical impression: normal ECG          ASSESSMENT   1.  Palpitations these are intermittent nature.  The has not had any in past 2 weeks.  Previous monitor 2020 revealed occasional PACs A. tach.  He would like to consider watching this for now.  They have not been a concern at this time.    2.  Atypical chest pain this is musculoskeletal in nature to his physical job.  He has no angina symptoms.  He was normal echocardiogram subsequent mild diastolic dysfunction.    3.  Hypertension: Blood pressure elevated 140s systolic.  We discussed need for continue management with this including diet, DASH diet low-sodium low processed foods increase exercise daily with cardio recent 85% target heart rate.  May consider adding medication if blood pressure remains elevated despite conservative measures.    4.  Sleep apnea symptoms he would like to pursue sleep study this may be playing a role in some of his palpitations well as hypertension.  Referral Dr. Madera for further evaluation.      PLAN  Sleep study  Lifestyle medication, DASH diet, cardio exercise monitoring blood pressure over  the next few weeks if remains elevated 130 systolic we will consider adding medications at that time.  If any change in symptoms we will pursue further testing.  Now he declines a repeat monitor or stress test.           Cardiology/Electrophysiology  10/10/24  10:00 EDT  Electronically signed by YANNICK Harris, 10/10/24, 10:05 AM EDT.

## 2024-10-10 ENCOUNTER — OFFICE VISIT (OUTPATIENT)
Dept: CARDIOLOGY | Facility: CLINIC | Age: 39
End: 2024-10-10
Payer: MEDICAID

## 2024-10-10 VITALS
HEART RATE: 82 BPM | DIASTOLIC BLOOD PRESSURE: 84 MMHG | HEIGHT: 70 IN | OXYGEN SATURATION: 98 % | BODY MASS INDEX: 29.49 KG/M2 | SYSTOLIC BLOOD PRESSURE: 136 MMHG | WEIGHT: 206 LBS

## 2024-10-10 DIAGNOSIS — I51.89 GRADE I DIASTOLIC DYSFUNCTION: ICD-10-CM

## 2024-10-10 DIAGNOSIS — R00.2 PALPITATIONS: Primary | ICD-10-CM

## 2024-10-10 PROCEDURE — 93000 ELECTROCARDIOGRAM COMPLETE: CPT | Performed by: PHYSICIAN ASSISTANT

## 2024-10-10 PROCEDURE — 99243 OFF/OP CNSLTJ NEW/EST LOW 30: CPT | Performed by: PHYSICIAN ASSISTANT

## 2024-10-31 ENCOUNTER — OFFICE VISIT (OUTPATIENT)
Dept: FAMILY MEDICINE CLINIC | Facility: CLINIC | Age: 39
End: 2024-10-31
Payer: MEDICAID

## 2024-10-31 VITALS
TEMPERATURE: 98.6 F | OXYGEN SATURATION: 99 % | SYSTOLIC BLOOD PRESSURE: 131 MMHG | HEART RATE: 62 BPM | WEIGHT: 203 LBS | BODY MASS INDEX: 29.06 KG/M2 | HEIGHT: 70 IN | DIASTOLIC BLOOD PRESSURE: 84 MMHG

## 2024-10-31 DIAGNOSIS — R07.89 MUSCULAR CHEST PAIN: Primary | ICD-10-CM

## 2024-10-31 DIAGNOSIS — R03.0 ELEVATED BP WITHOUT DIAGNOSIS OF HYPERTENSION: ICD-10-CM

## 2024-10-31 DIAGNOSIS — K58.0 IRRITABLE BOWEL SYNDROME WITH DIARRHEA: ICD-10-CM

## 2024-10-31 PROCEDURE — 99214 OFFICE O/P EST MOD 30 MIN: CPT | Performed by: STUDENT IN AN ORGANIZED HEALTH CARE EDUCATION/TRAINING PROGRAM

## 2024-10-31 PROCEDURE — 1126F AMNT PAIN NOTED NONE PRSNT: CPT | Performed by: STUDENT IN AN ORGANIZED HEALTH CARE EDUCATION/TRAINING PROGRAM

## 2024-10-31 NOTE — PROGRESS NOTES
Established Patient Office Visit        Subjective      Chief Complaint:  Hypertension (Per patient he is following up)      History of Present Illness: Aneudy Mckeon is a 39 y.o. male who presents for chest pain and HTN.    Had episodes of sharp chest pain for 10-20 seconds and happen in and out for a 1 minutes.     CXR WNL 10/2/24  EKG reviewed and normal cards note reviewed and thought to be MSK  Holter 2020 negative     Bps 120's/ 80s at home.     Patient Active Problem List   Diagnosis    Palpitations    Environmental and seasonal allergies    GERD (gastroesophageal reflux disease)    Acute gallstone pancreatitis    Grade I diastolic dysfunction    Elevated BP without diagnosis of hypertension    Acute pancreatitis    Other idiopathic scoliosis, thoracolumbar region    Cyst of skin    Irritable bowel syndrome with diarrhea    Bile salt-induced diarrhea    Chest pain         Current Outpatient Medications:     colestipol (COLESTID) 1 g tablet, Take 1 tablet twice daily as needed for loose stools, Disp: 180 tablet, Rfl: 3    cyclobenzaprine (FLEXERIL) 10 MG tablet, Take 1 tablet by mouth 2 (Two) Times a Day As Needed for Muscle Spasms., Disp: 60 tablet, Rfl: 2    dicyclomine (BENTYL) 10 MG capsule, Take 1 capsule by mouth 4 (Four) Times a Day As Needed for Abdominal Cramping., Disp: 30 capsule, Rfl: 1    fluticasone (FLONASE) 50 MCG/ACT nasal spray, Administer 2 sprays into the nostril(s) as directed by provider Daily. Otc, Disp: , Rfl:     Multiple Vitamin (MULTI-VITAMIN DAILY PO), Take  by mouth. Not since before surgery, Disp: , Rfl:     Omega-3 Fatty Acids (fish oil) 1000 MG capsule capsule, Take 1 capsule by mouth Daily With Breakfast., Disp: , Rfl:     omeprazole (priLOSEC) 20 MG capsule, Take 1 capsule by mouth 2 (Two) Times a Day., Disp: 60 capsule, Rfl: 5    Probiotic Product (Risaquad-2) capsule capsule, Take 1 capsule by mouth Daily. [place] 4 in 1 probiotic., Disp: , Rfl:       "  Objective     Physical Exam:   Vital Signs:   /84   Pulse 62   Temp 98.6 °F (37 °C) (Infrared)   Ht 177.8 cm (70\")   Wt 92.1 kg (203 lb)   SpO2 99%   BMI 29.13 kg/m²      Physical Exam  Constitutional:       General: He is not in acute distress.     Appearance: He is not ill-appearing.   Cardiovascular:      Rate and Rhythm: Normal rate and regular rhythm.   Pulmonary:      Effort: Pulmonary effort is normal.      Breath sounds: Normal breath sounds.   Neurological:      Mental Status: He is alert.   Psychiatric:         Thought Content: Thought content normal.   No tenderness to left chest wall         Assessment / Plan      Assessment/Plan:   Diagnoses and all orders for this visit:    1. Muscular chest pain (Primary)  No further eval at this time     2. Elevated BP without diagnosis of hypertension  Assessment & Plan:  BP at goal at home       3. Irritable bowel syndrome with diarrhea  Assessment & Plan:  Improved with colestipol qday      Follow Up:   Return in about 7 months (around 5/31/2025) for Wellness visit.    MDM: data review per hpi, med management     Toñito Lorenzo MD  Family Medicine - [facility] [place]  "

## 2024-11-19 NOTE — PROGRESS NOTES
Chief Complaint  Sleeping Problem, Follow-up, Daytime Sleepiness, Fatigue, Frequent Awakenings, and Snoring    Subjective     History of Present Illness:  Aneudy Mckeon is a 39 y.o. male with hypertension, diastolic dysfunction (grade 1), pancreatitis, and GERD.  The patient is referred by YANNICK Smith with cardiology.  Was recently seen for chest pain and palpitations.  Patient had a Zio patch and echocardiogram with occasional PACs and mild diastolic dysfunction noted on echo.  The patient has had symptoms concerning for sleep disordered breathing and obstructive sleep apnea.  Self-reported questionnaire notes symptoms including snoring, daytime sleepiness and fatigue, morning headaches, nonrestorative sleep, heartburn/reflux, nasal allergies, grinding teeth, leg and body jerks, leg cramps, frequent nighttime urination, pain at night (particularly due to scoliosis), sleep talking which been ongoing for 1-2 years.  Patient typically goes to bed at 130-2 AM waking at 7:30 AM on weekdays and 10-11 a.m. on weekends.  He estimates an average of 5.5 hours of sleep per night and it takes approximately 20 minutes for him to get to sleep.  Patient takes 20 to 30-minute naps on occasion.  He denies use of tobacco, alcohol, or illicit/recreational drugs.  Patient drinks 1 to 2 cups of regular coffee, and 1 regular cola daily.  The patient reports that his spouse has described witnessed episodes of snoring.    Further details are as follows:    Alden Scale is (out of 24): Total score: 16     Estimated average amount of sleep per night: 4-5 hours  Number of times he wakes up at night: 2-3 times  Difficulty falling back asleep: occasionally; waking coughing and choking  It usually takes 20-30 minutes to go to sleep.  He feels sleepy upon waking up: yes  Rotating or night shift work: no    Drowsiness/Sleepiness:  He exhibits the following:  excessive daytime sleepiness  excessive daytime fatigue  falls asleep  watching TV  difficulty driving due to sleepiness- pulls of the road if needed  Occasional naps    Snoring/Breathing:  He exhibits the following:  loud snoring, awakens with dry mouth, awakens gasping for breath, sore throat when waking up in the morning, and morning headaches    Head Injury:  He exhibits the following:  No    Reflux:  He describes the following:  wakes up at night with a sour taste or burning sensation in chest  takes medication for reflux    Narcolepsy:  He exhibits the following:  feeling of paralysis while going to sleep or coming out of sleep    RLS/PLMs:  He describes the following:  moves or jerks during sleep    Insomnia:  He describes the following:  frequent awakenings  bothered by pain at night    Parasomnia:  He exhibits the following:  sleep talks  grinds teeth    Weight:       11/22/24  1501   Weight: 91.4 kg (201 lb 9.6 oz)      Weight change in the last year:  loss: 5-6 lbs    The patient's relevant past medical, surgical, family, and social history reviewed and updated in Epic as appropriate.    Review of Systems   Constitutional:  Positive for fatigue.   HENT:  Positive for congestion, sinus pressure, sneezing and tinnitus.    Eyes: Negative.    Respiratory:  Positive for cough, choking and chest tightness.    Cardiovascular: Negative.    Gastrointestinal:  Positive for abdominal pain, constipation, diarrhea and nausea.   Endocrine: Negative.    Genitourinary: Negative.    Musculoskeletal:  Positive for back pain, myalgias, neck pain and neck stiffness.   Skin: Negative.    Allergic/Immunologic: Positive for environmental allergies.   Neurological:  Positive for light-headedness.   Hematological: Negative.    Psychiatric/Behavioral: Negative.     All other systems reviewed and are negative.      PMH:    Past Medical History:   Diagnosis Date    Allergic     Cholelithiasis     GERD (gastroesophageal reflux disease)     Grade I diastolic dysfunction 10/06/2021    Hypertension Recent  visits    Some readings fine. Some slightly high    Irritable bowel syndrome 05/2023    Worse past 6 months    Low back pain     Pancreatitis     along with gallbladder issues     Scoliosis      Past Surgical History:   Procedure Laterality Date    CHOLECYSTECTOMY      10/14/21    CHOLECYSTECTOMY WITH INTRAOPERATIVE CHOLANGIOGRAM N/A 10/07/2021    Procedure: CHOLECYSTECTOMY LAPAROSCOPIC INTRAOPERATIVE CHOLANGIOGRAM;  Surgeon: Sonny Mariscal MD;  Location: [facility];  Service: General;  Laterality: N/A;    DENTAL PROCEDURE      tooth removed    EYE SURGERY      Lasik       Allergies   Allergen Reactions    Benadryl [Diphenhydramine] Rash    Other Rash       MEDS:  Prior to Admission medications    Medication Sig Start Date End Date Taking? Authorizing Provider   colestipol (COLESTID) 1 g tablet Take 1 tablet twice daily as needed for loose stools 6/7/24   Sigrid Velarde PA-C   cyclobenzaprine (FLEXERIL) 10 MG tablet Take 1 tablet by mouth 2 (Two) Times a Day As Needed for Muscle Spasms. 6/7/24   Sigrid Velarde PA-C   dicyclomine (BENTYL) 10 MG capsule Take 1 capsule by mouth 4 (Four) Times a Day As Needed for Abdominal Cramping. 6/7/24   Sigrid Velarde PA-C   fluticasone (FLONASE) 50 MCG/ACT nasal spray Administer 2 sprays into the nostril(s) as directed by provider Daily. Otc    ProviderBrad MD   Multiple Vitamin (MULTI-VITAMIN DAILY PO) Take  by mouth. Not since before surgery    Emergency, Nurse Dionte, RN   Omega-3 Fatty Acids (fish oil) 1000 MG capsule capsule Take 1 capsule by mouth Daily With Breakfast.    ProviderBrad MD   omeprazole (priLOSEC) 20 MG capsule Take 1 capsule by mouth 2 (Two) Times a Day. 5/10/24   Toñito Lorenzo MD   Probiotic Product (Risaquad-2) capsule capsule Take 1 capsule by mouth Daily. [place] 4 in 1 probiotic.    ProviderBrad MD       FH:  Family History   Problem Relation Age of Onset    Diabetes type II Mother     Anxiety  "disorder Mother     Diabetes Mother     Diabetes type II Father     Diabetes Father     Hypertension Father     No Known Problems Brother     Colon cancer Paternal Grandmother 71    Diabetes type II Paternal Grandfather     Coronary artery disease Paternal Grandfather        Objective   Vital Signs:  /70 (BP Location: Right arm, Patient Position: Sitting, Cuff Size: Adult)   Pulse 82   Temp 98.4 °F (36.9 °C) (Temporal)   Ht 177.8 cm (70\")   Wt 91.4 kg (201 lb 9.6 oz)   SpO2 100%   BMI 28.93 kg/m²     Patient's (Body mass index is 28.93 kg/m².) indicates that they are overweight (BMI 25-29.9) with health related conditions that include obstructive sleep apnea, hypertension, coronary heart disease, and diabetes mellitus . Weight is improving with lifestyle modifications. BMI is is above average; BMI management plan is completed. We discussed portion control and increasing exercise. Going to weight watchers.          Physical Exam  Vitals reviewed.   Constitutional:       Appearance: Normal appearance.   HENT:      Head: Normocephalic and atraumatic.      Nose: Nose normal.      Mouth/Throat:      Mouth: Mucous membranes are moist.   Cardiovascular:      Rate and Rhythm: Normal rate and regular rhythm.      Heart sounds: No murmur heard.     No friction rub. No gallop.   Pulmonary:      Effort: Pulmonary effort is normal. No respiratory distress.      Breath sounds: Normal breath sounds. No wheezing or rhonchi.   Neurological:      Mental Status: He is alert and oriented to person, place, and time.   Psychiatric:         Behavior: Behavior normal.       Mallampati Score: III (soft and hard palate and base of uvula visible)    Result Review :              Assessment and Plan  Aneudy Mckeon is a 39 y.o. male with hypertension, diastolic dysfunction (grade 1), pancreatitis, and GERD who presents for further evaluation of excessive daytime sleepiness and fatigue, nonrestorative sleep, and concerns for " sleep disordered breathing and obstructive sleep apnea. The patient's symptoms, particularly snoring, are concerning for significant sleep disordered breathing and obstructive sleep apnea. We will obtain a home sleep test for further evaluation.  The patient will return for follow-up and recommendations after test.  I have discussed weight loss as it pertains to obstructive sleep apnea.     Diagnoses and all orders for this visit:    1. Sleep apnea, unspecified type (Primary)  -     Home Sleep Study; Future    2. Snoring  -     Home Sleep Study; Future    3. Excessive daytime sleepiness  -     Home Sleep Study; Future    4. Overweight with body mass index (BMI) 25.0-29.9                 I discussed the consequences of uncontrolled sleep apnea including hypertension, heart disease, diabetes, stroke, and dementia. I further discussed sleep apnea therapeutic options including CPAP, Weight loss, Oral dental appliance, and surgery.         Follow Up  Return for Follow up after study.  Patient was given instructions and counseling regarding his condition or for health maintenance advice. Please see specific information pulled into the AVS if appropriate.     FORTINO Vidal, ACNP-BC  Pulmonology, Critical Care, and Sleep Medicine

## 2024-11-22 ENCOUNTER — OFFICE VISIT (OUTPATIENT)
Dept: SLEEP MEDICINE | Age: 39
End: 2024-11-22
Payer: MEDICAID

## 2024-11-22 VITALS
BODY MASS INDEX: 28.86 KG/M2 | HEIGHT: 70 IN | HEART RATE: 82 BPM | SYSTOLIC BLOOD PRESSURE: 140 MMHG | TEMPERATURE: 98.4 F | DIASTOLIC BLOOD PRESSURE: 70 MMHG | WEIGHT: 201.6 LBS | OXYGEN SATURATION: 100 %

## 2024-11-22 DIAGNOSIS — G47.19 EXCESSIVE DAYTIME SLEEPINESS: ICD-10-CM

## 2024-11-22 DIAGNOSIS — E66.3 OVERWEIGHT WITH BODY MASS INDEX (BMI) 25.0-29.9: ICD-10-CM

## 2024-11-22 DIAGNOSIS — G47.30 SLEEP APNEA, UNSPECIFIED TYPE: Primary | ICD-10-CM

## 2024-11-22 DIAGNOSIS — R06.83 SNORING: ICD-10-CM

## 2024-12-17 DIAGNOSIS — M54.16 LUMBAR RADICULOPATHY: ICD-10-CM

## 2024-12-17 RX ORDER — CYCLOBENZAPRINE HCL 10 MG
10 TABLET ORAL 2 TIMES DAILY PRN
Qty: 60 TABLET | Refills: 2 | Status: SHIPPED | OUTPATIENT
Start: 2024-12-17

## 2025-01-05 DIAGNOSIS — K21.9 GASTROESOPHAGEAL REFLUX DISEASE WITHOUT ESOPHAGITIS: ICD-10-CM

## 2025-01-23 DIAGNOSIS — R19.7 DIARRHEA, UNSPECIFIED TYPE: ICD-10-CM

## 2025-01-23 DIAGNOSIS — K90.89 BILE SALT-INDUCED DIARRHEA: ICD-10-CM

## 2025-01-23 DIAGNOSIS — R10.11 RUQ PAIN: ICD-10-CM

## 2025-01-23 RX ORDER — DICYCLOMINE HYDROCHLORIDE 10 MG/1
10 CAPSULE ORAL 4 TIMES DAILY PRN
Qty: 30 CAPSULE | Refills: 1 | Status: SHIPPED | OUTPATIENT
Start: 2025-01-23

## 2025-01-23 RX ORDER — COLESTIPOL HYDROCHLORIDE 1 G/1
TABLET ORAL
Qty: 180 TABLET | Refills: 3 | Status: SHIPPED | OUTPATIENT
Start: 2025-01-23

## 2025-02-03 ENCOUNTER — HOSPITAL ENCOUNTER (OUTPATIENT)
Dept: SLEEP MEDICINE | Facility: HOSPITAL | Age: 40
Discharge: HOME | End: 2025-02-03
Admitting: NURSE PRACTITIONER
Payer: MEDICAID

## 2025-02-03 VITALS — HEIGHT: 70 IN | WEIGHT: 201.5 LBS | BODY MASS INDEX: 28.85 KG/M2

## 2025-02-03 DIAGNOSIS — G47.19 EXCESSIVE DAYTIME SLEEPINESS: ICD-10-CM

## 2025-02-03 DIAGNOSIS — G47.30 SLEEP APNEA, UNSPECIFIED TYPE: ICD-10-CM

## 2025-02-03 DIAGNOSIS — R06.83 SNORING: ICD-10-CM

## 2025-02-03 PROCEDURE — G0399 HOME SLEEP TEST/TYPE 3 PORTA: HCPCS

## 2025-02-04 DIAGNOSIS — R06.83 SNORING: ICD-10-CM

## 2025-02-04 DIAGNOSIS — G47.33 OSA (OBSTRUCTIVE SLEEP APNEA): Primary | ICD-10-CM

## 2025-02-07 ENCOUNTER — TELEPHONE (OUTPATIENT)
Age: 40
End: 2025-02-07
Payer: MEDICAID

## 2025-02-10 ENCOUNTER — OFFICE VISIT (OUTPATIENT)
Dept: FAMILY MEDICINE CLINIC | Facility: CLINIC | Age: 40
End: 2025-02-10
Payer: MEDICAID

## 2025-02-10 VITALS
OXYGEN SATURATION: 98 % | WEIGHT: 207.2 LBS | BODY MASS INDEX: 29.66 KG/M2 | TEMPERATURE: 98.7 F | HEIGHT: 70 IN | SYSTOLIC BLOOD PRESSURE: 132 MMHG | DIASTOLIC BLOOD PRESSURE: 90 MMHG | HEART RATE: 85 BPM

## 2025-02-10 DIAGNOSIS — J32.9 SINUSITIS, UNSPECIFIED CHRONICITY, UNSPECIFIED LOCATION: Primary | ICD-10-CM

## 2025-02-10 PROCEDURE — 1159F MED LIST DOCD IN RCRD: CPT | Performed by: STUDENT IN AN ORGANIZED HEALTH CARE EDUCATION/TRAINING PROGRAM

## 2025-02-10 PROCEDURE — 1126F AMNT PAIN NOTED NONE PRSNT: CPT | Performed by: STUDENT IN AN ORGANIZED HEALTH CARE EDUCATION/TRAINING PROGRAM

## 2025-02-10 PROCEDURE — 99213 OFFICE O/P EST LOW 20 MIN: CPT | Performed by: STUDENT IN AN ORGANIZED HEALTH CARE EDUCATION/TRAINING PROGRAM

## 2025-02-10 PROCEDURE — 1160F RVW MEDS BY RX/DR IN RCRD: CPT | Performed by: STUDENT IN AN ORGANIZED HEALTH CARE EDUCATION/TRAINING PROGRAM

## 2025-02-10 NOTE — PROGRESS NOTES
"Chief Complaint  Headache (Per patient he has had a sinus headache, tenderness above and below eyes. He stated this has been going on since Christmas eve . ) and pressure in ears    Headache        Main symptom: symptom is pain and pressure in the face with nasal congestion. He has a lot of thick yellow discharge in the nose.   Began: weeks ago   Fever is not present  SOB/cp are not present  GI symptoms are not present  Cough is not present  No other complaints.       The following portions of the patient's history were reviewed and updated as appropriate: allergies, current medications, past family history, past medical history, past social history, past surgical history, and problem list.    OBJECTIVE:  /90   Pulse 85   Temp 98.7 °F (37.1 °C) (Infrared)   Ht 177.8 cm (70\")   Wt 94 kg (207 lb 3.2 oz)   SpO2 98%   BMI 29.73 kg/m²       Physical Exam  Constitutional:       General: He is not in acute distress.     Appearance: Normal appearance.   HENT:      Head: Normocephalic and atraumatic.   Eyes:      Extraocular Movements: Extraocular movements intact.   Cardiovascular:      Rate and Rhythm: Normal rate and regular rhythm.      Heart sounds: No murmur heard.  Pulmonary:      Effort: Pulmonary effort is normal. No respiratory distress.      Breath sounds: Normal breath sounds. No stridor. No wheezing, rhonchi or rales.   Skin:     Findings: No rash.   Neurological:      General: No focal deficit present.      Mental Status: He is alert.   Psychiatric:         Mood and Affect: Mood normal.                  Assessment and Plan   Diagnoses and all orders for this visit:    1. Sinusitis, unspecified chronicity, unspecified location (Primary)    Other orders  -     amoxicillin-clavulanate (AUGMENTIN) 875-125 MG per tablet; Take 1 tablet by mouth 2 (Two) Times a Day.  Dispense: 14 tablet; Refill: 0      Sounds like he has a sinus infection given several weeks of thick drainage colored from nose. I asked him " to seek care if no improvement.     No follow-ups on file.       Cate Arreola D.O.  [facility] [place]

## 2025-02-14 DIAGNOSIS — K21.9 GASTROESOPHAGEAL REFLUX DISEASE WITHOUT ESOPHAGITIS: ICD-10-CM

## 2025-02-18 DIAGNOSIS — G47.33 OSA (OBSTRUCTIVE SLEEP APNEA): Primary | ICD-10-CM

## 2025-03-25 ENCOUNTER — PATIENT ROUNDING (BHMG ONLY) (OUTPATIENT)
Dept: URGENT CARE | Facility: CLINIC | Age: 40
End: 2025-03-25
Payer: MEDICAID

## 2025-03-25 NOTE — ED NOTES
Thank you for letting us care for you in your recent visit to our urgent care center. We would love to hear about your experience with us. Was this the first time you have visited our location?    We’re always looking for ways to make our patients’ experiences even better. Do you have any recommendations on ways we may improve?     I appreciate you taking the time to respond. Please be on the lookout for a survey about your recent visit from appening via text or email. We would greatly appreciate if you could fill that out and turn it back in. We want your voice to be heard and we value your feedback.   Thank you for choosing [facility] for your healthcare needs.

## 2025-05-19 NOTE — PROGRESS NOTES
Sleep Clinic Video Visit Follow Up Note    The patient is located at their home address in [place], [place]. The patient presents today for telehealth service.  This service was conducted via audio/video technology through a secure [place] video visit connection through Epic.  This provider is located in [facility].  Patient stated they are in a secure environment for the session.  Patient's condition being diagnosed/treated is appropriate for telemedicine.  The provider identified himself as well as his credentials.  The patient, and/or patient's guardian, consent to be seen remotely, and when consent is given they understanding that the consent allows for patient identifiable information to be sent to a third-party as needed.  They may refuse to be seen remotely at any time.  The electronic data is encrypted and password protected, and the patient and/or guardian has been advised of the potential risk to privacy not withstanding such measures.  Patient identifiers used: Name and date of birth.     You have chosen to receive care through a telehealth visit.  Do you consent to use a video connection for your medical care today? Yes     Mode of Visit: Video  Location of patient: -HOME-  Location of provider: +[facility]+  You have chosen to receive care through a telehealth visit.  The patient has signed the video visit consent form.  The visit included audio and video interaction. No technical issues occurred during this visit.      Chief Complaint  Follow-up and compliance of PAP therapy    Subjective     History of Present Illness (from previous encounter on 11/22/2024):  Aneudy Mckeon is a 39 y.o. male with hypertension, diastolic dysfunction (grade 1), pancreatitis, and GERD.  The patient is referred by YANNICK Smith with cardiology.  Was recently seen for chest pain and palpitations.  Patient had a Zio patch and echocardiogram with occasional PACs and mild diastolic dysfunction noted on  echo.  The patient has had symptoms concerning for sleep disordered breathing and obstructive sleep apnea.  Self-reported questionnaire notes symptoms including snoring, daytime sleepiness and fatigue, morning headaches, nonrestorative sleep, heartburn/reflux, nasal allergies, grinding teeth, leg and body jerks, leg cramps, frequent nighttime urination, pain at night (particularly due to scoliosis), sleep talking which been ongoing for 1-2 years.  Patient typically goes to bed at 130-2 AM waking at 7:30 AM on weekdays and 10-11 a.m. on weekends.  He estimates an average of 5.5 hours of sleep per night and it takes approximately 20 minutes for him to get to sleep.  Patient takes 20 to 30-minute naps on occasion.  He denies use of tobacco, alcohol, or illicit/recreational drugs.  Patient drinks 1 to 2 cups of regular coffee, and 1 regular cola daily.  The patient reports that his spouse has described witnessed episodes of snoring.     Further details are as follows:     [place] Scale is (out of 24): Total score: 16 (End copied text)    -A home sleep test was obtained on 2/4/2025 revealing mild obstructive sleep apnea with an AHI of 5.2/H.     Interval History:  Aneudy Mckeon is a 40 y.o. male returns for follow up and compliance of PAP therapy. The patient was last seen on 11/22/2024 by me. Overall the patient feels poor with regard to therapy. He used the device for a week and then gave up. He was moving and packed it away. He felt as though he was suffocating at times. He had to be able to sleep for work. He asks about alternative masks. The device appears to be working appropriately. On average the patient sleeps 4-6 hours per night. The patient wake 1-2 times per night.     The patient reports the following changes to their medical and medication history since they were last seen:  Back pain/scoliosis    Further details are as follows:    [place] Scale is: 16/24    Weight:  Current Weight: 205 lb      The  patient's relevant past medical, surgical, family, and social history reviewed and updated in Epic as appropriate.    PMH:    Past Medical History:   Diagnosis Date    Allergic     Cholelithiasis     GERD (gastroesophageal reflux disease)     Grade I diastolic dysfunction 10/06/2021    Hypertension Recent visits    Some readings fine. Some slightly high    Irritable bowel syndrome 05/2023    Worse past 6 months    Low back pain     Pancreatitis     along with gallbladder issues     Scoliosis      Past Surgical History:   Procedure Laterality Date    CHOLECYSTECTOMY      10/14/21    CHOLECYSTECTOMY WITH INTRAOPERATIVE CHOLANGIOGRAM N/A 10/07/2021    Procedure: CHOLECYSTECTOMY LAPAROSCOPIC INTRAOPERATIVE CHOLANGIOGRAM;  Surgeon: Sonny Mariscal MD;  Location: [facility];  Service: General;  Laterality: N/A;    DENTAL PROCEDURE      tooth removed    EYE SURGERY      Lasik       Allergies   Allergen Reactions    Benadryl [Diphenhydramine] Rash     Childhood allergy        MEDS:  Prior to Admission medications    Medication Sig Start Date End Date Taking? Authorizing Provider   amoxicillin-clavulanate (AUGMENTIN) 875-125 MG per tablet Take 1 tablet by mouth 2 (Two) Times a Day. 2/10/25   Cate Arreola DO   colestipol (COLESTID) 1 g tablet Take 1 tablet twice daily as needed for loose stools 1/23/25   Eloise Sams MD   cyclobenzaprine (FLEXERIL) 10 MG tablet Take 1 tablet by mouth 2 (Two) Times a Day As Needed for Muscle Spasms. 12/17/24   Sigrid Velarde PA-C   dicyclomine (BENTYL) 10 MG capsule Take 1 capsule by mouth 4 (Four) Times a Day As Needed for Abdominal Cramping. 1/23/25   Eloise Sams MD   fluticasone (FLONASE) 50 MCG/ACT nasal spray Administer 2 sprays into the nostril(s) as directed by provider Daily. Otc    Provider, MD Brad   Multiple Vitamin (MULTI-VITAMIN DAILY PO) Take  by mouth. Not since before surgery    Emergency, Nurse Dionte, RN   Omega-3 Fatty Acids (fish oil) 1000 MG  "capsule capsule Take 1 capsule by mouth Daily With Breakfast.    Provider, MD Brad   omeprazole (priLOSEC) 20 MG capsule TAKE 1 CAPSULE BY MOUTH 2 TIMES A DAY 2/14/25   Toñito Lorenzo MD   Probiotic Product (Risaquad-2) capsule capsule Take 1 capsule by mouth Daily. [email or web address] 4 in 1 probiotic.    Provider, MD Brad         FH:  Family History   Problem Relation Age of Onset    Diabetes type II Mother     Anxiety disorder Mother     Diabetes Mother     Diabetes type II Father     Diabetes Father     Hypertension Father     No Known Problems Brother     Colon cancer Paternal Grandmother 71    Diabetes type II Paternal Grandfather     Coronary artery disease Paternal Grandfather        Objective   Vital Signs:  Ht 177.8 cm (70\")   Wt 93 kg (205 lb)   BMI 29.41 kg/m²     Patient's (Body mass index is 29.41 kg/m².) indicates that they are overweight (BMI 25-29.9) .            Physical Exam  Constitutional:       Appearance: Normal appearance.   Neurological:      Mental Status: He is alert and oriented to person, place, and time.   Psychiatric:         Mood and Affect: Mood normal.         Behavior: Behavior normal.         Thought Content: Thought content normal.         Judgment: Judgment normal.               PAP Report:  AHI: 1.6/h  Days of Usage: 3/30 (10%)  Number of Days Greater than 4 hours: 3%  Average time (days used): 2 hours 27 minutes  95th Percentile Pressure: 7.7 cmH2O  95th percentile leaks: 2.8 L/min  Settings: Auto CPAP-5-/20 cm H2O, EPR full-time, EPR level 1, response standard.       Assessment and Plan  Aneudy Mckeon is a 40 y.o. male who returns for follow-up and compliance of PAP therapy.  The pap report has been reviewed.  Overall usage is at 10% with compliance at 3%.  The patient averaged 2 hours and 27 minutes of therapy.  Sleep apnea is well-controlled with an AHI of 1.6/H.  Patient has had difficulty with his mask and feelings of suffocation.  He used the " device for a little bit of time but was unable to sleep very much and ultimately discontinued.  Patient was also in the middle of moving.  He is going to go to the [employer] and try different mask.  I have discussed alternatives including dental appliance, and surgical consult.  I will place orders for supplies and have asked him to return for follow-up and recheck in approximately 4 months.      Diagnoses and all orders for this visit:    1. KATHLEEN (obstructive sleep apnea) (Primary)  -     PAP Therapy    2. Overweight with body mass index (BMI) 25.0-29.9         The patient continues to use and benefit from PAP therapy.    1. The patient was counseled regarding multimodal approach with healthy nutrition, healthy sleep, regular physical activity, social activities, counseling, and medications. Encouraged to practice lateral sleep position. Avoid alcohol and sedatives close to bedtime.     2.  We will refill supplies x1 year.  Return to clinic 1 year or sooner if symptoms warrant.  Patient gave verbal consent today for video visit. I have reviewed the results of my evaluation and impression and discussed my recommendations in detail with the patient.         Follow Up  Return in about 4 months (around 9/22/2025) for Video visit.  Patient was given instructions and counseling regarding his condition or for health maintenance advice. Please see specific information pulled into the AVS if appropriate.       FORTINO Vidal, ACNP-BC  Pulmonology, Critical Care, and Sleep Medicine

## 2025-05-22 ENCOUNTER — TELEMEDICINE (OUTPATIENT)
Dept: SLEEP MEDICINE | Age: 40
End: 2025-05-22
Payer: MEDICAID

## 2025-05-22 ENCOUNTER — TELEPHONE (OUTPATIENT)
Dept: SLEEP MEDICINE | Age: 40
End: 2025-05-22

## 2025-05-22 VITALS — HEIGHT: 70 IN | BODY MASS INDEX: 29.35 KG/M2 | WEIGHT: 205 LBS

## 2025-05-22 DIAGNOSIS — G47.33 OSA (OBSTRUCTIVE SLEEP APNEA): Primary | ICD-10-CM

## 2025-05-22 DIAGNOSIS — E66.3 OVERWEIGHT WITH BODY MASS INDEX (BMI) 25.0-29.9: ICD-10-CM

## 2025-05-22 NOTE — TELEPHONE ENCOUNTER
Patient called to advise he called DME to get refitted for pap mask, DME needs orders to state re-fitted orders.  Re-faxed orders over.

## 2025-05-29 ENCOUNTER — OFFICE VISIT (OUTPATIENT)
Dept: FAMILY MEDICINE CLINIC | Facility: CLINIC | Age: 40
End: 2025-05-29
Payer: MEDICAID

## 2025-05-29 ENCOUNTER — LAB (OUTPATIENT)
Dept: LAB | Facility: HOSPITAL | Age: 40
End: 2025-05-29
Payer: MEDICAID

## 2025-05-29 VITALS
SYSTOLIC BLOOD PRESSURE: 148 MMHG | HEART RATE: 88 BPM | WEIGHT: 214.2 LBS | TEMPERATURE: 98 F | RESPIRATION RATE: 18 BRPM | BODY MASS INDEX: 30.67 KG/M2 | OXYGEN SATURATION: 97 % | DIASTOLIC BLOOD PRESSURE: 82 MMHG | HEIGHT: 70 IN

## 2025-05-29 DIAGNOSIS — R10.11 RUQ PAIN: ICD-10-CM

## 2025-05-29 DIAGNOSIS — R03.0 ELEVATED BP WITHOUT DIAGNOSIS OF HYPERTENSION: ICD-10-CM

## 2025-05-29 DIAGNOSIS — M41.25 OTHER IDIOPATHIC SCOLIOSIS, THORACOLUMBAR REGION: ICD-10-CM

## 2025-05-29 DIAGNOSIS — G47.33 OSA (OBSTRUCTIVE SLEEP APNEA): ICD-10-CM

## 2025-05-29 DIAGNOSIS — Z00.00 ENCOUNTER FOR ROUTINE ADULT HEALTH EXAMINATION WITHOUT ABNORMAL FINDINGS: ICD-10-CM

## 2025-05-29 DIAGNOSIS — Z00.00 ENCOUNTER FOR ROUTINE ADULT HEALTH EXAMINATION WITHOUT ABNORMAL FINDINGS: Primary | ICD-10-CM

## 2025-05-29 LAB
ALBUMIN SERPL-MCNC: 4.5 G/DL (ref 3.5–5.2)
ALBUMIN/GLOB SERPL: 1.9 G/DL
ALP SERPL-CCNC: 62 U/L (ref 39–117)
ALT SERPL W P-5'-P-CCNC: 69 U/L (ref 1–41)
ANION GAP SERPL CALCULATED.3IONS-SCNC: 12 MMOL/L (ref 5–15)
AST SERPL-CCNC: 39 U/L (ref 1–40)
BASOPHILS # BLD AUTO: 0.04 10*3/MM3 (ref 0–0.2)
BASOPHILS NFR BLD AUTO: 0.7 % (ref 0–1.5)
BILIRUB SERPL-MCNC: 0.4 MG/DL (ref 0–1.2)
BUN SERPL-MCNC: 15 MG/DL (ref 6–20)
BUN/CREAT SERPL: 14.3 (ref 7–25)
CALCIUM SPEC-SCNC: 9.4 MG/DL (ref 8.6–10.5)
CHLORIDE SERPL-SCNC: 102 MMOL/L (ref 98–107)
CHOLEST SERPL-MCNC: 174 MG/DL (ref 0–200)
CO2 SERPL-SCNC: 24 MMOL/L (ref 22–29)
CREAT SERPL-MCNC: 1.05 MG/DL (ref 0.76–1.27)
DEPRECATED RDW RBC AUTO: 40.3 FL (ref 37–54)
EGFRCR SERPLBLD CKD-EPI 2021: 93.2 ML/MIN/1.73
EOSINOPHIL # BLD AUTO: 0.08 10*3/MM3 (ref 0–0.4)
EOSINOPHIL NFR BLD AUTO: 1.5 % (ref 0.3–6.2)
ERYTHROCYTE [DISTWIDTH] IN BLOOD BY AUTOMATED COUNT: 12.4 % (ref 12.3–15.4)
GLOBULIN UR ELPH-MCNC: 2.4 GM/DL
GLUCOSE SERPL-MCNC: 92 MG/DL (ref 65–99)
HBA1C MFR BLD: 5.3 % (ref 4.8–5.6)
HCT VFR BLD AUTO: 42.3 % (ref 37.5–51)
HDLC SERPL-MCNC: 41 MG/DL (ref 40–60)
HGB BLD-MCNC: 14.6 G/DL (ref 13–17.7)
IMM GRANULOCYTES # BLD AUTO: 0.06 10*3/MM3 (ref 0–0.05)
IMM GRANULOCYTES NFR BLD AUTO: 1.1 % (ref 0–0.5)
LDLC SERPL CALC-MCNC: 102 MG/DL (ref 0–100)
LDLC/HDLC SERPL: 2.36 {RATIO}
LYMPHOCYTES # BLD AUTO: 1.43 10*3/MM3 (ref 0.7–3.1)
LYMPHOCYTES NFR BLD AUTO: 26.5 % (ref 19.6–45.3)
MCH RBC QN AUTO: 31.2 PG (ref 26.6–33)
MCHC RBC AUTO-ENTMCNC: 34.5 G/DL (ref 31.5–35.7)
MCV RBC AUTO: 90.4 FL (ref 79–97)
MONOCYTES # BLD AUTO: 0.61 10*3/MM3 (ref 0.1–0.9)
MONOCYTES NFR BLD AUTO: 11.3 % (ref 5–12)
NEUTROPHILS NFR BLD AUTO: 3.18 10*3/MM3 (ref 1.7–7)
NEUTROPHILS NFR BLD AUTO: 58.9 % (ref 42.7–76)
NRBC BLD AUTO-RTO: 0 /100 WBC (ref 0–0.2)
PLATELET # BLD AUTO: 198 10*3/MM3 (ref 140–450)
PMV BLD AUTO: 10.2 FL (ref 6–12)
POTASSIUM SERPL-SCNC: 4.2 MMOL/L (ref 3.5–5.2)
PROT SERPL-MCNC: 6.9 G/DL (ref 6–8.5)
RBC # BLD AUTO: 4.68 10*6/MM3 (ref 4.14–5.8)
SODIUM SERPL-SCNC: 138 MMOL/L (ref 136–145)
TRIGL SERPL-MCNC: 181 MG/DL (ref 0–150)
TSH SERPL DL<=0.05 MIU/L-ACNC: 1.77 UIU/ML (ref 0.27–4.2)
VLDLC SERPL-MCNC: 31 MG/DL (ref 5–40)
WBC NRBC COR # BLD AUTO: 5.4 10*3/MM3 (ref 3.4–10.8)

## 2025-05-29 PROCEDURE — 83036 HEMOGLOBIN GLYCOSYLATED A1C: CPT

## 2025-05-29 PROCEDURE — 84443 ASSAY THYROID STIM HORMONE: CPT

## 2025-05-29 PROCEDURE — 85025 COMPLETE CBC W/AUTO DIFF WBC: CPT

## 2025-05-29 PROCEDURE — 80053 COMPREHEN METABOLIC PANEL: CPT

## 2025-05-29 PROCEDURE — 80061 LIPID PANEL: CPT

## 2025-05-29 RX ORDER — DICYCLOMINE HYDROCHLORIDE 10 MG/1
10 CAPSULE ORAL 4 TIMES DAILY PRN
Qty: 30 CAPSULE | Refills: 5 | Status: SHIPPED | OUTPATIENT
Start: 2025-05-29

## 2025-05-29 NOTE — PROGRESS NOTES
Male Physical Note      Patient Name: Aneudy Mckeon  : 1985   MRN: [id number]     Subjective    Subjective     Chief Complaint:    Chief Complaint   Patient presents with    Annual Exam       History of Present Illness: Aneudy Mckeon is a 40 y.o. male who is here today for their annual health maintenance and physical.      Past Medical History, Social History, Family History and Care Team were all reviewed with patient and updated as appropriate.     Medications:     Current Outpatient Medications:     colestipol (COLESTID) 1 g tablet, Take 1 tablet twice daily as needed for loose stools, Disp: 180 tablet, Rfl: 3    cyclobenzaprine (FLEXERIL) 10 MG tablet, Take 1 tablet by mouth 2 (Two) Times a Day As Needed for Muscle Spasms., Disp: 60 tablet, Rfl: 2    dicyclomine (BENTYL) 10 MG capsule, Take 1 capsule by mouth 4 (Four) Times a Day As Needed for Abdominal Cramping., Disp: 30 capsule, Rfl: 5    fluticasone (FLONASE) 50 MCG/ACT nasal spray, Administer 2 sprays into the nostril(s) as directed by provider Daily. Otc, Disp: , Rfl:     Multiple Vitamin (MULTI-VITAMIN DAILY PO), Take  by mouth. Not since before surgery, Disp: , Rfl:     Omega-3 Fatty Acids (fish oil) 1000 MG capsule capsule, Take 1 capsule by mouth Daily With Breakfast., Disp: , Rfl:     omeprazole (priLOSEC) 20 MG capsule, TAKE 1 CAPSULE BY MOUTH 2 TIMES A DAY, Disp: 180 capsule, Rfl: 3    Probiotic Product (Risaquad-2) capsule capsule, Take 1 capsule by mouth Daily. [facility] 4 in 1 probiotic., Disp: , Rfl:     Allergies:   Allergies   Allergen Reactions    Benadryl [Diphenhydramine] Rash     Childhood allergy        Immunizations:  Td/Tdap(Booster Q 10 yrs):  utd  Colorectal Screening:     Last Completed Colonoscopy    This patient has no relevant Health Maintenance data.        Diet/Physical activity: discussed    PHQ-2 Depression Screening  Little interest or pleasure in doing things? Not at all   Feeling down,  "depressed, or hopeless? Not at all   PHQ-2 Total Score 0        Objective   Objective     Physical Exam:  Vital Signs:   Vitals:    05/29/25 1000   BP: 148/82   BP Location: Left arm   Patient Position: Sitting   Cuff Size: Adult   Pulse: 88   Resp: 18   Temp: 98 °F (36.7 °C)   TempSrc: Temporal   SpO2: 97%   Weight: 97.2 kg (214 lb 3.2 oz)   Height: 177.8 cm (70\")   PainSc: 0-No pain     Body mass index is 30.73 kg/m².     Physical Exam  Constitutional:       General: He is not in acute distress.     Appearance: He is not ill-appearing.   Cardiovascular:      Rate and Rhythm: Normal rate and regular rhythm.   Pulmonary:      Effort: Pulmonary effort is normal.      Breath sounds: Normal breath sounds.   Neurological:      Mental Status: He is alert.   Psychiatric:         Thought Content: Thought content normal.         Procedures    Assessment / Plan      Assessment/Plan:   Diagnoses and all orders for this visit:    1. Encounter for routine adult health examination without abnormal findings (Primary)  -     Comprehensive Metabolic Panel; Future  -     CBC & Differential; Future  -     Hemoglobin A1c; Future  -     Lipid Panel; Future  -     TSH Rfx On Abnormal To Free T4; Future    2. Elevated BP without diagnosis of hypertension  Assessment & Plan:  Weight loss healthy diet, follow-up in 6 months      3. KATHLEEN (obstructive sleep apnea)  Assessment & Plan:  Working on getting new mask as not tolerating well.       4. Other idiopathic scoliosis, thoracolumbar region  Assessment & Plan:  Seeing chiropractics  Known DDD to L5-S1 as well      5. RUQ pain  -     dicyclomine (BENTYL) 10 MG capsule; Take 1 capsule by mouth 4 (Four) Times a Day As Needed for Abdominal Cramping.  Dispense: 30 capsule; Refill: 5           Follow Up:   Return in about 6 months (around 11/29/2025) for Follow-up blood pressure .    Healthcare Maintenance:   Health maintenance counseling included discussion of healthy diet and physical activity, " Dental hygiene, and vaccinations.  Aneudy Mckeon voices understanding and acceptance of this advice and will call back with any further questions or concerns. AVS with preventive healthcare tips printed for patient.     Toñito Lorenzo MD  Family Medicine - [facility]

## 2025-06-15 DIAGNOSIS — M54.16 LUMBAR RADICULOPATHY: ICD-10-CM

## 2025-06-16 RX ORDER — CYCLOBENZAPRINE HCL 10 MG
TABLET ORAL
Qty: 60 TABLET | Refills: 0 | Status: SHIPPED | OUTPATIENT
Start: 2025-06-16

## 2025-08-07 DIAGNOSIS — M54.16 LUMBAR RADICULOPATHY: ICD-10-CM

## 2025-08-07 RX ORDER — CYCLOBENZAPRINE HCL 10 MG
TABLET ORAL
Qty: 60 TABLET | Refills: 0 | Status: SHIPPED | OUTPATIENT
Start: 2025-08-07

## (undated) DEVICE — 30977 SEE SHARP - ENHANCED INTRAOPERATIVE LAPAROSCOPE CLEANING & DEFOGGING: Brand: 30977 SEE SHARP - ENHANCED INTRAOPERATIVE LAPAROSCOPE CLEANING & DEFOGGING

## (undated) DEVICE — GOWN,PREVENTION PLUS,XXLARGE,STERILE: Brand: MEDLINE

## (undated) DEVICE — FEEDING TUBE,RADIOPAQUE: Brand: ARGYLE

## (undated) DEVICE — SUT VIC 0 UR6 27IN VCP603H

## (undated) DEVICE — Device

## (undated) DEVICE — SNAP KOVER: Brand: UNBRANDED

## (undated) DEVICE — PK LAP LASR CHOLE 10

## (undated) DEVICE — SUT SILK 2/0 TIES 18IN A185H

## (undated) DEVICE — ENDOCUT SCISSOR TIP, DISPOSABLE: Brand: RENEW

## (undated) DEVICE — PATIENT RETURN ELECTRODE, SINGLE-USE, CONTACT QUALITY MONITORING, ADULT, WITH 9FT CORD, FOR PATIENTS WEIGING OVER 33LBS. (15KG): Brand: MEGADYNE

## (undated) DEVICE — SYR LUERLOK 20CC BX/50

## (undated) DEVICE — ENDOPATH XCEL BLADELESS TROCARS WITH STABILITY SLEEVES: Brand: ENDOPATH XCEL

## (undated) DEVICE — LAPAROSCOPIC SCISSORS: Brand: EPIX LAPAROSCOPIC SCISSORS

## (undated) DEVICE — ST EXT IV TBG W SECUR LK 20IN

## (undated) DEVICE — STPCK 4WY ON/OFF VLV M/COLAR FIT 45PSI STRL

## (undated) DEVICE — GLV SURG SENSICARE PI MIC PF SZ8 LF STRL

## (undated) DEVICE — GLV SURG SENSICARE PI MIC PF SZ8.5 LF STRL

## (undated) DEVICE — ADAPTER,CATHETER/SYRINGE/LUER,STERILE: Brand: MEDLINE

## (undated) DEVICE — [HIGH FLOW INSUFFLATOR,  DO NOT USE IF PACKAGE IS DAMAGED,  KEEP DRY,  KEEP AWAY FROM SUNLIGHT,  PROTECT FROM HEAT AND RADIOACTIVE SOURCES.]: Brand: PNEUMOSURE

## (undated) DEVICE — ENDOPOUCH RETRIEVER SPECIMEN RETRIEVAL BAGS: Brand: ENDOPOUCH RETRIEVER

## (undated) DEVICE — SUT MNCRYL PLS ANTIB UD 4/0 PS2 18IN

## (undated) DEVICE — SYR LUERLOK 30CC

## (undated) DEVICE — ENDOPATH XCEL UNIVERSAL TROCAR STABLILITY SLEEVES: Brand: ENDOPATH XCEL